# Patient Record
Sex: FEMALE | Race: WHITE | NOT HISPANIC OR LATINO | Employment: OTHER | URBAN - METROPOLITAN AREA
[De-identification: names, ages, dates, MRNs, and addresses within clinical notes are randomized per-mention and may not be internally consistent; named-entity substitution may affect disease eponyms.]

---

## 2021-03-24 ENCOUNTER — APPOINTMENT (OUTPATIENT)
Dept: WOMENS IMAGING | Facility: HOSPITAL | Age: 66
End: 2021-03-24

## 2021-03-24 PROCEDURE — 77067 SCR MAMMO BI INCL CAD: CPT | Performed by: RADIOLOGY

## 2022-03-22 RX ORDER — AMLODIPINE BESYLATE 5 MG/1
TABLET ORAL
Qty: 30 TABLET | Refills: 0 | Status: SHIPPED | OUTPATIENT
Start: 2022-03-22 | End: 2022-05-09 | Stop reason: SDUPTHER

## 2022-03-22 RX ORDER — AMLODIPINE BESYLATE 5 MG/1
5 TABLET ORAL DAILY
COMMUNITY
End: 2022-04-08 | Stop reason: SDUPTHER

## 2022-03-28 RX ORDER — DULOXETIN HYDROCHLORIDE 30 MG/1
90 CAPSULE, DELAYED RELEASE ORAL DAILY
Qty: 270 CAPSULE | Refills: 1 | Status: SHIPPED | OUTPATIENT
Start: 2022-03-28 | End: 2022-05-09 | Stop reason: SDUPTHER

## 2022-04-05 RX ORDER — CARVEDILOL 25 MG/1
TABLET ORAL
COMMUNITY
Start: 2022-02-23 | End: 2022-04-05 | Stop reason: SDUPTHER

## 2022-04-05 RX ORDER — CARVEDILOL 25 MG/1
25 TABLET ORAL 2 TIMES DAILY WITH MEALS
Qty: 180 TABLET | Refills: 1 | Status: SHIPPED | OUTPATIENT
Start: 2022-04-05 | End: 2022-05-09 | Stop reason: SDUPTHER

## 2022-04-05 RX ORDER — LISINOPRIL 10 MG/1
TABLET ORAL
Qty: 90 TABLET | Refills: 1 | Status: SHIPPED | OUTPATIENT
Start: 2022-04-05 | End: 2022-05-09

## 2022-04-06 RX ORDER — ESOMEPRAZOLE MAGNESIUM 40 MG/1
40 CAPSULE, DELAYED RELEASE ORAL
Qty: 90 CAPSULE | Refills: 1 | Status: SHIPPED | OUTPATIENT
Start: 2022-04-06 | End: 2022-05-09 | Stop reason: SDUPTHER

## 2022-04-06 RX ORDER — OXYBUTYNIN CHLORIDE 10 MG/1
TABLET, EXTENDED RELEASE ORAL
COMMUNITY
Start: 2022-03-18 | End: 2022-04-06 | Stop reason: SDUPTHER

## 2022-04-06 RX ORDER — ESOMEPRAZOLE MAGNESIUM 40 MG/1
CAPSULE, DELAYED RELEASE ORAL
COMMUNITY
Start: 2022-03-16 | End: 2022-04-06 | Stop reason: SDUPTHER

## 2022-04-06 RX ORDER — OXYBUTYNIN CHLORIDE 10 MG/1
10 TABLET, EXTENDED RELEASE ORAL DAILY
Qty: 90 TABLET | Refills: 1 | Status: SHIPPED | OUTPATIENT
Start: 2022-04-06 | End: 2022-05-09 | Stop reason: SDUPTHER

## 2022-04-06 NOTE — TELEPHONE ENCOUNTER
PATIENT CALLED SHE SAID SHE IS COMPLETLEY OUT OF BLADDER CONTROL  MEDICATION AND HAS ABOUT A WEEK LEFT OF HER ACID REFLUX MEDICATION. SHE USED [facility]

## 2022-04-08 RX ORDER — AMLODIPINE BESYLATE 5 MG/1
TABLET ORAL
Qty: 30 TABLET | Refills: 0 | Status: SHIPPED | OUTPATIENT
Start: 2022-04-08 | End: 2022-05-09 | Stop reason: SDUPTHER

## 2022-05-09 ENCOUNTER — OFFICE VISIT (OUTPATIENT)
Dept: FAMILY MEDICINE CLINIC | Facility: CLINIC | Age: 67
End: 2022-05-09

## 2022-05-09 VITALS
BODY MASS INDEX: 41.54 KG/M2 | WEIGHT: 220 LBS | HEIGHT: 61 IN | DIASTOLIC BLOOD PRESSURE: 86 MMHG | SYSTOLIC BLOOD PRESSURE: 160 MMHG | OXYGEN SATURATION: 90 % | TEMPERATURE: 97.7 F | HEART RATE: 93 BPM

## 2022-05-09 DIAGNOSIS — H61.23 BILATERAL IMPACTED CERUMEN: ICD-10-CM

## 2022-05-09 DIAGNOSIS — K21.9 GASTROESOPHAGEAL REFLUX DISEASE, UNSPECIFIED WHETHER ESOPHAGITIS PRESENT: ICD-10-CM

## 2022-05-09 DIAGNOSIS — E03.9 ACQUIRED HYPOTHYROIDISM: ICD-10-CM

## 2022-05-09 DIAGNOSIS — N18.31 STAGE 3A CHRONIC KIDNEY DISEASE: ICD-10-CM

## 2022-05-09 DIAGNOSIS — G89.4 CHRONIC PAIN SYNDROME: ICD-10-CM

## 2022-05-09 DIAGNOSIS — Z51.81 MEDICATION MONITORING ENCOUNTER: ICD-10-CM

## 2022-05-09 DIAGNOSIS — Z79.890 HORMONE REPLACEMENT THERAPY (HRT): ICD-10-CM

## 2022-05-09 DIAGNOSIS — F41.1 GAD (GENERALIZED ANXIETY DISORDER): ICD-10-CM

## 2022-05-09 DIAGNOSIS — E66.01 MORBID OBESITY: ICD-10-CM

## 2022-05-09 DIAGNOSIS — I10 PRIMARY HYPERTENSION: Primary | ICD-10-CM

## 2022-05-09 PROBLEM — N20.2 CALCULUS OF KIDNEY AND URETER: Status: ACTIVE | Noted: 2020-08-11

## 2022-05-09 PROBLEM — R39.15 URINARY URGENCY: Status: ACTIVE | Noted: 2020-01-14

## 2022-05-09 PROBLEM — R35.0 INCREASED FREQUENCY OF URINATION: Status: ACTIVE | Noted: 2020-01-13

## 2022-05-09 PROBLEM — N32.81 OAB (OVERACTIVE BLADDER): Status: ACTIVE | Noted: 2019-07-31

## 2022-05-09 PROBLEM — R31.29 MICROSCOPIC HEMATURIA: Status: ACTIVE | Noted: 2019-10-01

## 2022-05-09 PROBLEM — N32.81 OVERACTIVE BLADDER: Status: ACTIVE | Noted: 2020-08-11

## 2022-05-09 PROBLEM — E66.9 OBESITY WITH BODY MASS INDEX 30 OR GREATER: Status: ACTIVE | Noted: 2020-08-11

## 2022-05-09 PROBLEM — N39.0 URINARY TRACT INFECTIOUS DISEASE: Status: ACTIVE | Noted: 2019-07-31

## 2022-05-09 PROBLEM — N39.0 RECURRENT URINARY TRACT INFECTION: Status: ACTIVE | Noted: 2019-08-29

## 2022-05-09 LAB
EXPIRATION DATE: ABNORMAL
Lab: ABNORMAL
POC AMPHETAMINES: NEGATIVE
POC BARBITURATES: NEGATIVE
POC BENZODIAZEPHINES: POSITIVE
POC COCAINE: NEGATIVE
POC METHADONE: NEGATIVE
POC METHAMPHETAMINE SCREEN URINE: NEGATIVE
POC OPIATES: NEGATIVE
POC OXYCODONE: NEGATIVE
POC PHENCYCLIDINE: NEGATIVE
POC PROPOXYPHENE: NEGATIVE
POC THC: NEGATIVE
POC TRICYCLIC ANTIDEPRESSANTS: POSITIVE

## 2022-05-09 PROCEDURE — 99213 OFFICE O/P EST LOW 20 MIN: CPT | Performed by: FAMILY MEDICINE

## 2022-05-09 PROCEDURE — 69210 REMOVE IMPACTED EAR WAX UNI: CPT | Performed by: FAMILY MEDICINE

## 2022-05-09 PROCEDURE — 80305 DRUG TEST PRSMV DIR OPT OBS: CPT | Performed by: FAMILY MEDICINE

## 2022-05-09 RX ORDER — HYDROCODONE BITARTRATE AND ACETAMINOPHEN 7.5; 325 MG/1; MG/1
1 TABLET ORAL EVERY 6 HOURS
COMMUNITY
End: 2022-11-15

## 2022-05-09 RX ORDER — LOSARTAN POTASSIUM 50 MG/1
50 TABLET ORAL DAILY
Qty: 90 TABLET | Refills: 1 | Status: SHIPPED | OUTPATIENT
Start: 2022-05-09 | End: 2022-11-15 | Stop reason: SDUPTHER

## 2022-05-09 RX ORDER — IBUPROFEN 600 MG/1
600 TABLET ORAL EVERY 8 HOURS PRN
Qty: 90 TABLET | Refills: 1 | Status: SHIPPED | OUTPATIENT
Start: 2022-05-09 | End: 2023-01-13 | Stop reason: SDUPTHER

## 2022-05-09 RX ORDER — LOSARTAN POTASSIUM 50 MG/1
TABLET ORAL
COMMUNITY
Start: 2022-04-05 | End: 2022-05-09 | Stop reason: SDUPTHER

## 2022-05-09 RX ORDER — DIAZEPAM 10 MG/1
10 TABLET ORAL EVERY 8 HOURS PRN
Qty: 90 TABLET | Refills: 2 | Status: SHIPPED | OUTPATIENT
Start: 2022-05-09 | End: 2022-06-09

## 2022-05-09 RX ORDER — BUSPIRONE HYDROCHLORIDE 30 MG/1
TABLET ORAL EVERY 12 HOURS SCHEDULED
COMMUNITY
End: 2022-05-09 | Stop reason: SDUPTHER

## 2022-05-09 RX ORDER — HYDROXYZINE 50 MG/1
TABLET, FILM COATED ORAL EVERY 6 HOURS SCHEDULED
COMMUNITY
End: 2022-05-09 | Stop reason: SDUPTHER

## 2022-05-09 RX ORDER — ESTRADIOL 2 MG/1
TABLET ORAL
COMMUNITY
End: 2022-05-09 | Stop reason: SDUPTHER

## 2022-05-09 RX ORDER — DIAZEPAM 10 MG/1
TABLET ORAL EVERY 12 HOURS SCHEDULED
COMMUNITY
End: 2022-05-09 | Stop reason: SDUPTHER

## 2022-05-09 RX ORDER — OXYBUTYNIN CHLORIDE 10 MG/1
TABLET, EXTENDED RELEASE ORAL
COMMUNITY
Start: 2021-12-20 | End: 2022-06-09 | Stop reason: SDUPTHER

## 2022-05-09 RX ORDER — PREGABALIN 50 MG/1
50 CAPSULE ORAL DAILY
Qty: 90 CAPSULE | Refills: 1 | Status: CANCELLED | OUTPATIENT
Start: 2022-05-09

## 2022-05-09 RX ORDER — TIZANIDINE 4 MG/1
1 TABLET ORAL EVERY 6 HOURS
COMMUNITY
End: 2022-05-09 | Stop reason: SDUPTHER

## 2022-05-09 RX ORDER — BUSPIRONE HYDROCHLORIDE 30 MG/1
30 TABLET ORAL EVERY 12 HOURS SCHEDULED
Qty: 180 TABLET | Refills: 1 | Status: SHIPPED | OUTPATIENT
Start: 2022-05-09 | End: 2022-11-15 | Stop reason: SDUPTHER

## 2022-05-09 RX ORDER — DULOXETIN HYDROCHLORIDE 30 MG/1
120 CAPSULE, DELAYED RELEASE ORAL DAILY
Qty: 120 CAPSULE | Refills: 1 | Status: SHIPPED | OUTPATIENT
Start: 2022-05-09 | End: 2022-07-21

## 2022-05-09 RX ORDER — CARVEDILOL 25 MG/1
25 TABLET ORAL 2 TIMES DAILY WITH MEALS
Qty: 180 TABLET | Refills: 1 | Status: SHIPPED | OUTPATIENT
Start: 2022-05-09 | End: 2022-11-15 | Stop reason: SDUPTHER

## 2022-05-09 RX ORDER — ESOMEPRAZOLE MAGNESIUM 40 MG/1
40 CAPSULE, DELAYED RELEASE ORAL
Qty: 90 CAPSULE | Refills: 1 | Status: SHIPPED | OUTPATIENT
Start: 2022-05-09 | End: 2023-03-23

## 2022-05-09 RX ORDER — CARVEDILOL 25 MG/1
1 TABLET ORAL 2 TIMES DAILY WITH MEALS
COMMUNITY
Start: 2021-11-29 | End: 2022-05-09 | Stop reason: SDUPTHER

## 2022-05-09 RX ORDER — QUETIAPINE FUMARATE 400 MG/1
TABLET, FILM COATED ORAL EVERY 12 HOURS SCHEDULED
COMMUNITY
End: 2022-05-09 | Stop reason: SDUPTHER

## 2022-05-09 RX ORDER — AMLODIPINE BESYLATE 5 MG/1
5 TABLET ORAL DAILY
Qty: 90 TABLET | Refills: 1 | Status: SHIPPED | OUTPATIENT
Start: 2022-05-09 | End: 2022-11-15 | Stop reason: SDUPTHER

## 2022-05-09 RX ORDER — IBUPROFEN 600 MG/1
TABLET ORAL
COMMUNITY
Start: 2022-05-01 | End: 2022-05-09 | Stop reason: SDUPTHER

## 2022-05-09 RX ORDER — LEVOTHYROXINE SODIUM 0.05 MG/1
TABLET ORAL
COMMUNITY
Start: 2021-11-29 | End: 2022-05-09 | Stop reason: SDUPTHER

## 2022-05-09 RX ORDER — ESTRADIOL 2 MG/1
2 TABLET ORAL DAILY
Qty: 90 TABLET | Refills: 1 | Status: SHIPPED | OUTPATIENT
Start: 2022-05-09 | End: 2022-11-14

## 2022-05-09 RX ORDER — QUETIAPINE FUMARATE 400 MG/1
400 TABLET, FILM COATED ORAL EVERY 12 HOURS SCHEDULED
Qty: 180 TABLET | Refills: 1 | Status: SHIPPED | OUTPATIENT
Start: 2022-05-09 | End: 2022-08-22 | Stop reason: SDUPTHER

## 2022-05-09 RX ORDER — AMLODIPINE BESYLATE 5 MG/1
1 TABLET ORAL DAILY
COMMUNITY
Start: 2021-11-29 | End: 2022-05-09

## 2022-05-09 RX ORDER — TIZANIDINE 4 MG/1
4 TABLET ORAL EVERY 6 HOURS
Qty: 120 TABLET | Refills: 1 | Status: SHIPPED | OUTPATIENT
Start: 2022-05-09 | End: 2022-08-17 | Stop reason: SDUPTHER

## 2022-05-09 RX ORDER — HYDROXYZINE 50 MG/1
50 TABLET, FILM COATED ORAL EVERY 6 HOURS SCHEDULED
Qty: 120 TABLET | Refills: 1 | Status: SHIPPED | OUTPATIENT
Start: 2022-05-09 | End: 2022-12-01 | Stop reason: SDUPTHER

## 2022-05-09 RX ORDER — LEVOTHYROXINE SODIUM 0.05 MG/1
50 TABLET ORAL DAILY
Qty: 90 TABLET | Refills: 1 | Status: SHIPPED | OUTPATIENT
Start: 2022-05-09 | End: 2022-08-17 | Stop reason: SDUPTHER

## 2022-05-09 RX ORDER — PREGABALIN 50 MG/1
CAPSULE ORAL
COMMUNITY
Start: 2022-04-05 | End: 2022-05-17 | Stop reason: SDUPTHER

## 2022-05-09 RX ORDER — DULOXETIN HYDROCHLORIDE 30 MG/1
CAPSULE, DELAYED RELEASE ORAL
COMMUNITY
End: 2022-05-09 | Stop reason: SDUPTHER

## 2022-05-09 NOTE — ASSESSMENT & PLAN NOTE
Renal condition is newly identified.  Continue current treatment regimen.  Weight loss.  Renal condition will be reassessed in 1 month.

## 2022-05-09 NOTE — ASSESSMENT & PLAN NOTE
Diazepam refilled.  Adverse effects discussed.  Avoid alcohol, driving, or operating machinery while taking medications.

## 2022-05-09 NOTE — PROGRESS NOTES
Date: 2022   Patient Name: Alina Tilley  : 1955   MRN: [id number]     Chief Complaint:    Chief Complaint   Patient presents with   • Med Refill       History of Present Illness: Alina Tilley is a 66 y.o. female who is here today to follow up for Chronic medical conditions to include hypertension, anxiety, GERD, hormone replacement therapy, and chronic pain syndrome.  She is needing multiple medication refills and reports medications continue to work well without side effects.  She is complaining of bilateral ear pain and ears feeling full.             Review of Systems:   Review of Systems   Constitutional: Negative for chills, fatigue and fever.   Respiratory: Negative for cough and shortness of breath.    Cardiovascular: Negative for chest pain and palpitations.   Gastrointestinal: Negative for abdominal pain, constipation, diarrhea, nausea and vomiting.   Musculoskeletal: Positive for arthralgias and myalgias. Negative for back pain.   Neurological: Negative for dizziness and headache.   Psychiatric/Behavioral: Negative for depressed mood. The patient is not nervous/anxious.        Past Medical History:   Past Medical History:   Diagnosis Date   • Anxiety    • Arthritis    • Common migraine    • Congestive heart failure (CHF) (HCC)    • Esophageal reflux    • Hearing problem    • IBS (irritable bowel syndrome)    • Insomnia    • Neurotic depression    • Obesity    • Overactive bladder    • Thyroiditis    • Vision problems        Past Surgical History:   Past Surgical History:   Procedure Laterality Date   • APPENDECTOMY     • HERNIA REPAIR     • HYSTERECTOMY     • KIDNEY SURGERY     • LAPAROSCOPIC TUBAL LIGATION     • TONSILLECTOMY         Family History:   Family History   Problem Relation Age of Onset   • Osteoporosis Mother    • Osteoarthritis Mother    • Hypertension Father    • Mental illness Father    • Alcohol abuse Father    • Depression Father    • Hypertension Brother    • Mental  illness Brother    • Alcohol abuse Brother    • Obesity Other    • Irritable bowel syndrome Other    • Mental illness Other    • Stroke Other    • Diabetes Other    • Cancer Other        Social History:   Social History     Socioeconomic History   • Marital status: Unknown   Tobacco Use   • Smoking status: Never Smoker   • Smokeless tobacco: Never Used   Vaping Use   • Vaping Use: Never used   Substance and Sexual Activity   • Alcohol use: Never   • Drug use: Never   • Sexual activity: Defer       Medications:     Current Outpatient Medications:   •  amLODIPine (NORVASC) 5 MG tablet, Take 1 tablet by mouth Daily., Disp: 90 tablet, Rfl: 1  •  busPIRone (BUSPAR) 30 MG tablet, Take 1 tablet by mouth Every 12 (Twelve) Hours., Disp: 180 tablet, Rfl: 1  •  carvedilol (COREG) 25 MG tablet, Take 1 tablet by mouth 2 (Two) Times a Day With Meals., Disp: 180 tablet, Rfl: 1  •  diazePAM (VALIUM) 10 MG tablet, Take 1 tablet by mouth Every 8 (Eight) Hours As Needed for Anxiety., Disp: 90 tablet, Rfl: 2  •  DULoxetine (CYMBALTA) 30 MG capsule, Take 4 capsules by mouth Daily., Disp: 120 capsule, Rfl: 1  •  esomeprazole (nexIUM) 40 MG capsule, Take 1 capsule by mouth Every Morning Before Breakfast., Disp: 90 capsule, Rfl: 1  •  estradiol (ESTRACE) 2 MG tablet, Take 1 tablet by mouth Daily., Disp: 90 tablet, Rfl: 1  •  HYDROcodone-acetaminophen (NORCO) 7.5-325 MG per tablet, Take 1 tablet by mouth Every 6 (Six) Hours., Disp: , Rfl:   •  hydrOXYzine (ATARAX) 50 MG tablet, Take 1 tablet by mouth Every 6 (Six) Hours., Disp: 120 tablet, Rfl: 1  •  ibuprofen (ADVIL,MOTRIN) 600 MG tablet, Take 1 tablet by mouth Every 8 (Eight) Hours As Needed for Mild Pain  or Moderate Pain ., Disp: 90 tablet, Rfl: 1  •  levothyroxine (SYNTHROID, LEVOTHROID) 50 MCG tablet, Take 1 tablet by mouth Daily., Disp: 90 tablet, Rfl: 1  •  losartan (COZAAR) 50 MG tablet, Take 1 tablet by mouth Daily., Disp: 90 tablet, Rfl: 1  •  oxybutynin XL (DITROPAN-XL) 10 MG 24  "hr tablet, oxybutynin chloride ER 10 mg tablet,extended release 24 hr  Take 1 tablet every day by oral route., Disp: , Rfl:   •  pregabalin (LYRICA) 50 MG capsule, , Disp: , Rfl:   •  QUEtiapine (SEROquel) 400 MG tablet, Take 1 tablet by mouth Every 12 (Twelve) Hours., Disp: 180 tablet, Rfl: 1  •  tiZANidine (ZANAFLEX) 4 MG tablet, Take 1 tablet by mouth Every 6 (Six) Hours., Disp: 120 tablet, Rfl: 1    Allergies:   Allergies   Allergen Reactions   • Aripiprazole Unknown - Low Severity   • Mirabegron Unknown - Low Severity   • Tramadol Unknown - Low Severity       PHQ-2 Total Score: 0   PHQ-9 Total Score: 0     Physical Exam:  Vital Signs:   Vitals:    05/09/22 1103   BP: 160/86   BP Location: Left arm   Patient Position: Sitting   Cuff Size: Large Adult   Pulse: 93   Temp: 97.7 °F (36.5 °C)   TempSrc: Temporal   SpO2: 90%   Weight: 99.8 kg (220 lb)   Height: 154.9 cm (61\")     Body mass index is 41.57 kg/m².       Physical Exam  Vitals and nursing note reviewed.   Constitutional:       Appearance: She is obese.      Comments: Chronically ill-appearing   HENT:      Head: Normocephalic and atraumatic.      Right Ear: There is impacted cerumen.      Left Ear: There is impacted cerumen.      Nose: Nose normal.      Mouth/Throat:      Mouth: Mucous membranes are moist.      Pharynx: Oropharynx is clear.   Eyes:      Conjunctiva/sclera: Conjunctivae normal.      Pupils: Pupils are equal, round, and reactive to light.   Cardiovascular:      Rate and Rhythm: Normal rate and regular rhythm.      Heart sounds: Normal heart sounds. No murmur heard.  Pulmonary:      Effort: Pulmonary effort is normal.      Breath sounds: Normal breath sounds. No wheezing, rhonchi or rales.   Abdominal:      General: Bowel sounds are normal.      Palpations: Abdomen is soft.      Tenderness: There is no abdominal tenderness.   Musculoskeletal:      Cervical back: Normal range of motion and neck supple.      Right lower leg: No edema.      Left " lower leg: No edema.   Lymphadenopathy:      Cervical: No cervical adenopathy.   Skin:     General: Skin is warm.      Findings: No rash.   Neurological:      General: No focal deficit present.      Mental Status: She is alert and oriented to person, place, and time.      Motor: No weakness.   Psychiatric:         Mood and Affect: Mood normal.         Behavior: Behavior normal.       Ear Cerumen Removal    Date/Time: 5/9/2022 11:54 AM  Performed by: Avis Mejias DO  Authorized by: Avis Mejias DO     Anesthesia:  Local Anesthetic: none  Location details: left ear and right ear  Patient tolerance: patient tolerated the procedure well with no immediate complications  Procedure type: instrumentation, irrigation   Sedation:  Patient sedated: no            Assessment/Plan:   Diagnoses and all orders for this visit:    1. Primary hypertension (Primary)  Assessment & Plan:  Hypertension is worsening.  Weight loss.  Regular aerobic exercise.  Ambulatory blood pressure monitoring.  Blood pressure will be reassessed in 4 weeks.    Orders:  -     amLODIPine (NORVASC) 5 MG tablet; Take 1 tablet by mouth Daily.  Dispense: 90 tablet; Refill: 1  -     carvedilol (COREG) 25 MG tablet; Take 1 tablet by mouth 2 (Two) Times a Day With Meals.  Dispense: 180 tablet; Refill: 1  -     losartan (COZAAR) 50 MG tablet; Take 1 tablet by mouth Daily.  Dispense: 90 tablet; Refill: 1    2. Gastroesophageal reflux disease, unspecified whether esophagitis present  Assessment & Plan:  Stable on PPI    Orders:  -     esomeprazole (nexIUM) 40 MG capsule; Take 1 capsule by mouth Every Morning Before Breakfast.  Dispense: 90 capsule; Refill: 1    3. BARBARA (generalized anxiety disorder)  Assessment & Plan:  Diazepam refilled.  Adverse effects discussed.  Avoid alcohol, driving, or operating machinery while taking medications.      Orders:  -     busPIRone (BUSPAR) 30 MG tablet; Take 1 tablet by mouth Every 12 (Twelve)  Hours.  Dispense: 180 tablet; Refill: 1  -     diazePAM (VALIUM) 10 MG tablet; Take 1 tablet by mouth Every 8 (Eight) Hours As Needed for Anxiety.  Dispense: 90 tablet; Refill: 2  -     DULoxetine (CYMBALTA) 30 MG capsule; Take 4 capsules by mouth Daily.  Dispense: 120 capsule; Refill: 1  -     hydrOXYzine (ATARAX) 50 MG tablet; Take 1 tablet by mouth Every 6 (Six) Hours.  Dispense: 120 tablet; Refill: 1  -     QUEtiapine (SEROquel) 400 MG tablet; Take 1 tablet by mouth Every 12 (Twelve) Hours.  Dispense: 180 tablet; Refill: 1    4. Hormone replacement therapy (HRT)  Assessment & Plan:  Stable on estradiol    Orders:  -     estradiol (ESTRACE) 2 MG tablet; Take 1 tablet by mouth Daily.  Dispense: 90 tablet; Refill: 1    5. Acquired hypothyroidism  -     levothyroxine (SYNTHROID, LEVOTHROID) 50 MCG tablet; Take 1 tablet by mouth Daily.  Dispense: 90 tablet; Refill: 1  -     TSH; Future  -     TSH    6. Chronic pain syndrome  -     ibuprofen (ADVIL,MOTRIN) 600 MG tablet; Take 1 tablet by mouth Every 8 (Eight) Hours As Needed for Mild Pain  or Moderate Pain .  Dispense: 90 tablet; Refill: 1  -     tiZANidine (ZANAFLEX) 4 MG tablet; Take 1 tablet by mouth Every 6 (Six) Hours.  Dispense: 120 tablet; Refill: 1    7. Stage 3a chronic kidney disease (HCC)  Assessment & Plan:  Renal condition is newly identified.  Continue current treatment regimen.  Weight loss.  Renal condition will be reassessed in 1 month.    Orders:  -     Basic metabolic panel; Future  -     Basic metabolic panel    8. Morbid obesity (HCC)  Assessment & Plan:  Patient's (Body mass index is 41.57 kg/m².) indicates that they are morbidly obese (BMI > 40 or > 35 with obesity - related health condition) with health conditions that include hypertension, dyslipidemias and GERD . Weight is unchanged. BMI is is above average; BMI management plan is completed. We discussed portion control and increasing exercise.       9. Bilateral impacted cerumen  Assessment  & Plan:  Resolved after irrigation and instrumentation    Orders:  -     Cerumen Removal    10. Medication monitoring encounter  -     POC Urine Drug Screen, Triage         Follow Up:   Return in about 1 month (around 6/9/2022) for BP check.    Avis Mejias DO  [facility] [place] [facility]

## 2022-05-09 NOTE — ASSESSMENT & PLAN NOTE
Patient's (Body mass index is 41.57 kg/m².) indicates that they are morbidly obese (BMI > 40 or > 35 with obesity - related health condition) with health conditions that include hypertension, dyslipidemias and GERD . Weight is unchanged. BMI is is above average; BMI management plan is completed. We discussed portion control and increasing exercise.

## 2022-05-09 NOTE — ASSESSMENT & PLAN NOTE
Hypertension is worsening.  Weight loss.  Regular aerobic exercise.  Ambulatory blood pressure monitoring.  Blood pressure will be reassessed in 4 weeks.

## 2022-05-10 LAB
BUN SERPL-MCNC: 12 MG/DL (ref 8–27)
BUN/CREAT SERPL: 12 (ref 12–28)
CALCIUM SERPL-MCNC: 8.5 MG/DL (ref 8.7–10.3)
CHLORIDE SERPL-SCNC: 105 MMOL/L (ref 96–106)
CO2 SERPL-SCNC: 23 MMOL/L (ref 20–29)
CREAT SERPL-MCNC: 1 MG/DL (ref 0.57–1)
EGFRCR SERPLBLD CKD-EPI 2021: 61 ML/MIN/1.73
GLUCOSE SERPL-MCNC: 137 MG/DL (ref 65–99)
POTASSIUM SERPL-SCNC: 3.4 MMOL/L (ref 3.5–5.2)
SODIUM SERPL-SCNC: 142 MMOL/L (ref 134–144)
TSH SERPL DL<=0.005 MIU/L-ACNC: 2.76 UIU/ML (ref 0.45–4.5)

## 2022-05-17 RX ORDER — PREGABALIN 50 MG/1
50 CAPSULE ORAL 2 TIMES DAILY
Qty: 60 CAPSULE | Refills: 2 | Status: SHIPPED | OUTPATIENT
Start: 2022-05-17 | End: 2022-08-17 | Stop reason: SDUPTHER

## 2022-06-01 RX ORDER — DIAZEPAM 5 MG/1
TABLET ORAL
Qty: 90 TABLET | OUTPATIENT
Start: 2022-06-01

## 2022-06-07 RX ORDER — DIAZEPAM 5 MG/1
TABLET ORAL
Qty: 90 TABLET | Refills: 2 | Status: SHIPPED | OUTPATIENT
Start: 2022-06-07 | End: 2022-11-15 | Stop reason: SDUPTHER

## 2022-06-09 ENCOUNTER — TELEMEDICINE (OUTPATIENT)
Dept: FAMILY MEDICINE CLINIC | Facility: CLINIC | Age: 67
End: 2022-06-09

## 2022-06-09 VITALS — BODY MASS INDEX: 41.54 KG/M2 | WEIGHT: 220 LBS | HEIGHT: 61 IN

## 2022-06-09 DIAGNOSIS — N32.81 OAB (OVERACTIVE BLADDER): Primary | ICD-10-CM

## 2022-06-09 PROCEDURE — 99212 OFFICE O/P EST SF 10 MIN: CPT | Performed by: FAMILY MEDICINE

## 2022-06-09 RX ORDER — BUSPIRONE HYDROCHLORIDE 30 MG/1
30 TABLET ORAL EVERY 12 HOURS SCHEDULED
Qty: 180 TABLET | Refills: 1 | Status: CANCELLED | OUTPATIENT
Start: 2022-06-09

## 2022-06-09 RX ORDER — IBUPROFEN 600 MG/1
600 TABLET ORAL EVERY 8 HOURS PRN
Qty: 90 TABLET | Refills: 1 | Status: CANCELLED | OUTPATIENT
Start: 2022-06-09

## 2022-06-09 RX ORDER — DIAZEPAM 5 MG/1
5 TABLET ORAL 3 TIMES DAILY PRN
Qty: 90 TABLET | Refills: 2 | Status: CANCELLED | OUTPATIENT
Start: 2022-06-09

## 2022-06-09 RX ORDER — LOSARTAN POTASSIUM 50 MG/1
50 TABLET ORAL DAILY
Qty: 90 TABLET | Refills: 1 | Status: CANCELLED | OUTPATIENT
Start: 2022-06-09

## 2022-06-09 RX ORDER — PREGABALIN 50 MG/1
50 CAPSULE ORAL 2 TIMES DAILY
Qty: 180 CAPSULE | Refills: 1 | Status: CANCELLED | OUTPATIENT
Start: 2022-06-09

## 2022-06-09 RX ORDER — OXYBUTYNIN CHLORIDE 10 MG/1
10 TABLET, EXTENDED RELEASE ORAL DAILY
Qty: 90 TABLET | Refills: 1 | Status: SHIPPED | OUTPATIENT
Start: 2022-06-09 | End: 2022-11-15 | Stop reason: SDUPTHER

## 2022-06-09 NOTE — PROGRESS NOTES
Patient was seen today through synchronous audio/video technology. Verbal consent was obtained. The patient was located at home. Vitals signs were not obtained due to lack of home monitoring access. Time spent with patient was 10 minutes.       Date: 2022   Patient Name: Alina Tilley  : 1955   MRN: [id number]     Chief Complaint:    Chief Complaint   Patient presents with   • Med Refill     Controlled substance refill        History of Present Illness: Alina Tilley is a 67 y.o. female who is here today to follow up for Overactive bladder. She is doing well on Oxybutynin and requests refill of this medication. She denies side effects.             Review of Systems:   Review of Systems   Constitutional: Positive for fatigue. Negative for chills and fever.   Respiratory: Negative for cough and shortness of breath.    Cardiovascular: Negative for chest pain and palpitations.   Gastrointestinal: Negative for abdominal pain, constipation, diarrhea, nausea and vomiting.   Genitourinary: Positive for frequency, urgency and urinary incontinence.   Musculoskeletal: Positive for arthralgias, back pain and myalgias.   Neurological: Negative for dizziness and headache.   Psychiatric/Behavioral: Negative for depressed mood. The patient is not nervous/anxious.        Past Medical History:   Past Medical History:   Diagnosis Date   • Anxiety    • Arthritis    • Common migraine    • Congestive heart failure (CHF) (HCC)    • Esophageal reflux    • Hearing problem    • IBS (irritable bowel syndrome)    • Insomnia    • Neurotic depression    • Obesity    • Overactive bladder    • Thyroiditis    • Vision problems        Past Surgical History:   Past Surgical History:   Procedure Laterality Date   • APPENDECTOMY     • HERNIA REPAIR     • HYSTERECTOMY     • KIDNEY SURGERY     • LAPAROSCOPIC TUBAL LIGATION     • TONSILLECTOMY         Family History:   Family History   Problem Relation Age of Onset   • Osteoporosis Mother     • Osteoarthritis Mother    • Hypertension Father    • Mental illness Father    • Alcohol abuse Father    • Depression Father    • Hypertension Brother    • Mental illness Brother    • Alcohol abuse Brother    • Obesity Other    • Irritable bowel syndrome Other    • Mental illness Other    • Stroke Other    • Diabetes Other    • Cancer Other        Social History:   Social History     Socioeconomic History   • Marital status: Unknown   Tobacco Use   • Smoking status: Never Smoker   • Smokeless tobacco: Never Used   Vaping Use   • Vaping Use: Never used   Substance and Sexual Activity   • Alcohol use: Never   • Drug use: Never   • Sexual activity: Defer       Medications:     Current Outpatient Medications:   •  amLODIPine (NORVASC) 5 MG tablet, Take 1 tablet by mouth Daily., Disp: 90 tablet, Rfl: 1  •  busPIRone (BUSPAR) 30 MG tablet, Take 1 tablet by mouth Every 12 (Twelve) Hours., Disp: 180 tablet, Rfl: 1  •  carvedilol (COREG) 25 MG tablet, Take 1 tablet by mouth 2 (Two) Times a Day With Meals., Disp: 180 tablet, Rfl: 1  •  diazePAM (VALIUM) 5 MG tablet, TAKE ONE TABLET BY MOUTH THREE TIMES A DAY AS NEEDED FOR ANXIETY, Disp: 90 tablet, Rfl: 2  •  DULoxetine (CYMBALTA) 30 MG capsule, Take 4 capsules by mouth Daily., Disp: 120 capsule, Rfl: 1  •  esomeprazole (nexIUM) 40 MG capsule, Take 1 capsule by mouth Every Morning Before Breakfast., Disp: 90 capsule, Rfl: 1  •  estradiol (ESTRACE) 2 MG tablet, Take 1 tablet by mouth Daily., Disp: 90 tablet, Rfl: 1  •  HYDROcodone-acetaminophen (NORCO) 7.5-325 MG per tablet, Take 1 tablet by mouth Every 6 (Six) Hours., Disp: , Rfl:   •  hydrOXYzine (ATARAX) 50 MG tablet, Take 1 tablet by mouth Every 6 (Six) Hours., Disp: 120 tablet, Rfl: 1  •  ibuprofen (ADVIL,MOTRIN) 600 MG tablet, Take 1 tablet by mouth Every 8 (Eight) Hours As Needed for Mild Pain  or Moderate Pain ., Disp: 90 tablet, Rfl: 1  •  levothyroxine (SYNTHROID, LEVOTHROID) 50 MCG tablet, Take 1 tablet by mouth  "Daily., Disp: 90 tablet, Rfl: 1  •  losartan (COZAAR) 50 MG tablet, Take 1 tablet by mouth Daily., Disp: 90 tablet, Rfl: 1  •  oxybutynin XL (DITROPAN-XL) 10 MG 24 hr tablet, Take 1 tablet by mouth Daily., Disp: 90 tablet, Rfl: 1  •  pregabalin (LYRICA) 50 MG capsule, Take 1 capsule by mouth 2 (Two) Times a Day., Disp: 60 capsule, Rfl: 2  •  QUEtiapine (SEROquel) 400 MG tablet, Take 1 tablet by mouth Every 12 (Twelve) Hours., Disp: 180 tablet, Rfl: 1  •  tiZANidine (ZANAFLEX) 4 MG tablet, Take 1 tablet by mouth Every 6 (Six) Hours., Disp: 120 tablet, Rfl: 1    Allergies:   Allergies   Allergen Reactions   • Aripiprazole Unknown - Low Severity   • Mirabegron Unknown - Low Severity   • Tramadol Unknown - Low Severity       PHQ-2 Total Score:     PHQ-9 Total Score:       Physical Exam:  Vital Signs:   Vitals:    06/09/22 1252   Weight: 99.8 kg (220 lb)   Height: 154.9 cm (61\")     Body mass index is 41.57 kg/m².     Physical Exam  Vitals and nursing note reviewed.   Neurological:      Mental Status: She is alert and oriented to person, place, and time.   Psychiatric:         Mood and Affect: Mood normal.           Assessment/Plan:   Diagnoses and all orders for this visit:    1. OAB (overactive bladder) (Primary)  Assessment & Plan:  Stable, oxybutynin refilled.     Orders:  -     oxybutynin XL (DITROPAN-XL) 10 MG 24 hr tablet; Take 1 tablet by mouth Daily.  Dispense: 90 tablet; Refill: 1         Follow Up:   Return in about 2 months (around 8/9/2022) for Med Recheck .    Avis Mejias, DO  [facility]    "

## 2022-07-14 ENCOUNTER — OFFICE VISIT (OUTPATIENT)
Dept: FAMILY MEDICINE CLINIC | Facility: CLINIC | Age: 67
End: 2022-07-14

## 2022-07-14 VITALS
OXYGEN SATURATION: 96 % | BODY MASS INDEX: 40.62 KG/M2 | SYSTOLIC BLOOD PRESSURE: 130 MMHG | WEIGHT: 215 LBS | DIASTOLIC BLOOD PRESSURE: 86 MMHG | HEART RATE: 72 BPM

## 2022-07-14 DIAGNOSIS — I10 PRIMARY HYPERTENSION: ICD-10-CM

## 2022-07-14 DIAGNOSIS — H65.93 MIDDLE EAR EFFUSION, BILATERAL: Primary | ICD-10-CM

## 2022-07-14 DIAGNOSIS — I50.9 CONGESTIVE HEART FAILURE, UNSPECIFIED HF CHRONICITY, UNSPECIFIED HEART FAILURE TYPE: ICD-10-CM

## 2022-07-14 PROCEDURE — 96372 THER/PROPH/DIAG INJ SC/IM: CPT | Performed by: FAMILY MEDICINE

## 2022-07-14 PROCEDURE — 99214 OFFICE O/P EST MOD 30 MIN: CPT | Performed by: FAMILY MEDICINE

## 2022-07-14 RX ORDER — TRIAMCINOLONE ACETONIDE 40 MG/ML
80 INJECTION, SUSPENSION INTRA-ARTICULAR; INTRAMUSCULAR ONCE
Status: COMPLETED | OUTPATIENT
Start: 2022-07-14 | End: 2022-07-14

## 2022-07-14 RX ORDER — FLUTICASONE PROPIONATE 50 MCG
2 SPRAY, SUSPENSION (ML) NASAL DAILY
Qty: 18.2 ML | Refills: 5 | Status: SHIPPED | OUTPATIENT
Start: 2022-07-14 | End: 2023-03-23

## 2022-07-14 RX ADMIN — TRIAMCINOLONE ACETONIDE 80 MG: 40 INJECTION, SUSPENSION INTRA-ARTICULAR; INTRAMUSCULAR at 14:54

## 2022-07-14 NOTE — PROGRESS NOTES
Date: 2022   Patient Name: Alina Tilley  : 1955   MRN: [id number]     Chief Complaint:    Chief Complaint   Patient presents with   • Follow-up     Med recheck   • Ear Fullness     Right ear fullness and sloshing sound   • Balance Issues     Noticed balance issues and wanted to discuss       History of Present Illness: Alina Tilley is a 67 y.o. female who is here today for Otalgia and dizziness with rapid head movement.  She reports it started in the past week and she is feeling fullness in her right ear as well as a sloshing crackling sound.  She reports when she quickly turns her head to either side she feels unsteady and like she needs to grab onto something.  She denies nausea or vomiting.    The patient also reports that when she last saw her pain management physician that they recommended she ask about a medication for her heart although she is unsure what the medication name was.  She does report a history of congestive heart failure diagnosed after hospitalization for COVID.  She thinks that she saw a cardiologist a couple times after but is unable to give me any information about those visits and we do not have anything in her chart regarding cardiology consultation.  She would like to see a cardiologist to reassess CHF.  She denies orthopnea or worsening lower extremity edema.           Review of Systems:   Review of Systems   Constitutional: Negative for chills, fatigue and fever.   HENT: Positive for ear pain.    Respiratory: Negative for cough and shortness of breath.    Cardiovascular: Negative for chest pain and palpitations.   Gastrointestinal: Negative for abdominal pain, constipation, diarrhea, nausea and vomiting.   Musculoskeletal: Positive for arthralgias and back pain. Negative for myalgias.   Neurological: Positive for dizziness. Negative for headache.   Psychiatric/Behavioral: Negative for depressed mood. The patient is not nervous/anxious.        Past Medical History:    Past Medical History:   Diagnosis Date   • Anxiety    • Arthritis    • Common migraine    • Congestive heart failure (CHF) (HCC)    • Esophageal reflux    • Hearing problem    • IBS (irritable bowel syndrome)    • Insomnia    • Neurotic depression    • Obesity    • Overactive bladder    • Thyroiditis    • Vision problems        Past Surgical History:   Past Surgical History:   Procedure Laterality Date   • APPENDECTOMY     • HERNIA REPAIR     • HYSTERECTOMY     • KIDNEY SURGERY     • LAPAROSCOPIC TUBAL LIGATION     • TONSILLECTOMY         Family History:   Family History   Problem Relation Age of Onset   • Osteoporosis Mother    • Osteoarthritis Mother    • Hypertension Father    • Mental illness Father    • Alcohol abuse Father    • Depression Father    • Hypertension Brother    • Mental illness Brother    • Alcohol abuse Brother    • Obesity Other    • Irritable bowel syndrome Other    • Mental illness Other    • Stroke Other    • Diabetes Other    • Cancer Other        Social History:   Social History     Socioeconomic History   • Marital status: Single   Tobacco Use   • Smoking status: Never Smoker   • Smokeless tobacco: Never Used   Vaping Use   • Vaping Use: Never used   Substance and Sexual Activity   • Alcohol use: Never   • Drug use: Never   • Sexual activity: Defer       Medications:     Current Outpatient Medications:   •  amLODIPine (NORVASC) 5 MG tablet, Take 1 tablet by mouth Daily., Disp: 90 tablet, Rfl: 1  •  busPIRone (BUSPAR) 30 MG tablet, Take 1 tablet by mouth Every 12 (Twelve) Hours., Disp: 180 tablet, Rfl: 1  •  carvedilol (COREG) 25 MG tablet, Take 1 tablet by mouth 2 (Two) Times a Day With Meals., Disp: 180 tablet, Rfl: 1  •  diazePAM (VALIUM) 5 MG tablet, TAKE ONE TABLET BY MOUTH THREE TIMES A DAY AS NEEDED FOR ANXIETY, Disp: 90 tablet, Rfl: 2  •  DULoxetine (CYMBALTA) 30 MG capsule, Take 4 capsules by mouth Daily., Disp: 120 capsule, Rfl: 1  •  esomeprazole (nexIUM) 40 MG capsule, Take 1  capsule by mouth Every Morning Before Breakfast., Disp: 90 capsule, Rfl: 1  •  estradiol (ESTRACE) 2 MG tablet, Take 1 tablet by mouth Daily., Disp: 90 tablet, Rfl: 1  •  hydrOXYzine (ATARAX) 50 MG tablet, Take 1 tablet by mouth Every 6 (Six) Hours., Disp: 120 tablet, Rfl: 1  •  ibuprofen (ADVIL,MOTRIN) 600 MG tablet, Take 1 tablet by mouth Every 8 (Eight) Hours As Needed for Mild Pain  or Moderate Pain ., Disp: 90 tablet, Rfl: 1  •  levothyroxine (SYNTHROID, LEVOTHROID) 50 MCG tablet, Take 1 tablet by mouth Daily., Disp: 90 tablet, Rfl: 1  •  losartan (COZAAR) 50 MG tablet, Take 1 tablet by mouth Daily., Disp: 90 tablet, Rfl: 1  •  oxybutynin XL (DITROPAN-XL) 10 MG 24 hr tablet, Take 1 tablet by mouth Daily., Disp: 90 tablet, Rfl: 1  •  pregabalin (LYRICA) 50 MG capsule, Take 1 capsule by mouth 2 (Two) Times a Day., Disp: 60 capsule, Rfl: 2  •  QUEtiapine (SEROquel) 400 MG tablet, Take 1 tablet by mouth Every 12 (Twelve) Hours., Disp: 180 tablet, Rfl: 1  •  tiZANidine (ZANAFLEX) 4 MG tablet, Take 1 tablet by mouth Every 6 (Six) Hours., Disp: 120 tablet, Rfl: 1  •  fluticasone (Flonase) 50 MCG/ACT nasal spray, 2 sprays into the nostril(s) as directed by provider Daily., Disp: 18.2 mL, Rfl: 5  •  HYDROcodone-acetaminophen (NORCO) 7.5-325 MG per tablet, Take 1 tablet by mouth Every 6 (Six) Hours., Disp: , Rfl:     Current Facility-Administered Medications:   •  triamcinolone acetonide (KENALOG-40) injection 80 mg, 80 mg, Intramuscular, Once, Avis Mejias DO    Allergies:   Allergies   Allergen Reactions   • Aripiprazole Unknown - Low Severity   • Mirabegron Unknown - Low Severity   • Tramadol Unknown - Low Severity       PHQ-2 Total Score:     PHQ-9 Total Score:       Physical Exam:  Vital Signs:   Vitals:    07/14/22 1408   BP: 130/86   BP Location: Left arm   Patient Position: Sitting   Cuff Size: Adult   Pulse: 72   SpO2: 96%   Weight: 97.5 kg (215 lb)     Body mass index is 40.62 kg/m².      Physical Exam  Vitals and nursing note reviewed.   Constitutional:       Comments: Chronically ill-appearing   HENT:      Right Ear: A middle ear effusion is present.      Left Ear: A middle ear effusion is present.   Cardiovascular:      Rate and Rhythm: Normal rate and regular rhythm.      Heart sounds: Normal heart sounds. No murmur heard.  Pulmonary:      Effort: Pulmonary effort is normal.      Breath sounds: Normal breath sounds. No wheezing.   Neurological:      Mental Status: She is alert and oriented to person, place, and time. Mental status is at baseline.   Psychiatric:         Mood and Affect: Mood normal.         Behavior: Behavior normal.           Assessment/Plan:   Diagnoses and all orders for this visit:    1. Middle ear effusion, bilateral (Primary)  Assessment & Plan:  Kenalog in office today and Rx Flonase for the patient to use at home.  She will call if symptoms are persistent or worsening.    Orders:  -     fluticasone (Flonase) 50 MCG/ACT nasal spray; 2 sprays into the nostril(s) as directed by provider Daily.  Dispense: 18.2 mL; Refill: 5  -     triamcinolone acetonide (KENALOG-40) injection 80 mg    2. Congestive heart failure, unspecified HF chronicity, unspecified heart failure type (HCC)  Assessment & Plan:  Congestive heart failure due to viral infection.  Heart failure is unchanged.    Continue current medications.  Refer to Cardiology.  Heart failure will be reassessed in 6 months.    Orders:  -     Ambulatory Referral to Cardiology    3. Primary hypertension  Assessment & Plan:  Referred to cardiology as above    Orders:  -     Ambulatory Referral to Cardiology         Follow Up:   Return in about 2 months (around 9/14/2022) for Med Recheck .    Avis Mejias DO  [facility]

## 2022-07-14 NOTE — ASSESSMENT & PLAN NOTE
Kenalog in office today and Rx Flonase for the patient to use at home.  She will call if symptoms are persistent or worsening.

## 2022-07-14 NOTE — ASSESSMENT & PLAN NOTE
Congestive heart failure due to viral infection.  Heart failure is unchanged.    Continue current medications.  Refer to Cardiology.  Heart failure will be reassessed in 6 months.

## 2022-07-21 DIAGNOSIS — F41.1 GAD (GENERALIZED ANXIETY DISORDER): ICD-10-CM

## 2022-07-21 RX ORDER — DULOXETIN HYDROCHLORIDE 30 MG/1
CAPSULE, DELAYED RELEASE ORAL
Qty: 120 CAPSULE | Refills: 1 | Status: SHIPPED | OUTPATIENT
Start: 2022-07-21 | End: 2022-09-20

## 2022-07-25 ENCOUNTER — TELEPHONE (OUTPATIENT)
Dept: FAMILY MEDICINE CLINIC | Facility: CLINIC | Age: 67
End: 2022-07-25

## 2022-07-25 NOTE — TELEPHONE ENCOUNTER
Caller: Alina Tilley    Relationship: Self    Best call back number: [phone]     What form or medical record are you requesting: HANDICAP PLACARD    Who is requesting this form or medical record from you: [employer]    How would you like to receive the form or medical records (pick-up, mail, fax):  If fax, what is the fax number:  If mail, what is the address: [address] [facility] [address]  If pick-up, provide patient with address and location details    Timeframe paperwork needed: AS SOON AS POSSIBLE    Additional notes: PATIENT CALLED IN STATED HER VEHICLE WAS SALVAGED AND HER HANDICAP PLACARD WAS LEFT IN THE CAR SHE IS REQUESTING HER PCP TO FILL OUT OR WRITE A LETTER STATING PATIENT IS DISABLED AND NEEDS HANDICAP PLACARD REPLACED

## 2022-08-17 ENCOUNTER — TELEPHONE (OUTPATIENT)
Dept: FAMILY MEDICINE CLINIC | Facility: CLINIC | Age: 67
End: 2022-08-17

## 2022-08-17 DIAGNOSIS — G89.4 CHRONIC PAIN SYNDROME: ICD-10-CM

## 2022-08-17 DIAGNOSIS — E03.9 ACQUIRED HYPOTHYROIDISM: ICD-10-CM

## 2022-08-17 NOTE — TELEPHONE ENCOUNTER
Caller: Alina Tilley    Relationship: Self    Best call back number: [phone]    Requested Prescriptions:   Requested Prescriptions     Pending Prescriptions Disp Refills   • levothyroxine (SYNTHROID, LEVOTHROID) 50 MCG tablet 90 tablet 1     Sig: Take 1 tablet by mouth Daily.   • tiZANidine (ZANAFLEX) 4 MG tablet 120 tablet 1     Sig: Take 1 tablet by mouth Every 6 (Six) Hours.   • pregabalin (LYRICA) 50 MG capsule 60 capsule 2     Sig: Take 1 capsule by mouth 2 (Two) Times a Day.        Pharmacy where request should be sent: [facility] [address] - [phone]  - [phone] FX     Additional details provided by patient:     Does the patient have less than a 3 day supply:  [x] Yes  [] No    Kianna Bryan Rep   08/17/22 12:19 EDT

## 2022-08-19 ENCOUNTER — TELEPHONE (OUTPATIENT)
Dept: FAMILY MEDICINE CLINIC | Facility: CLINIC | Age: 67
End: 2022-08-19

## 2022-08-19 DIAGNOSIS — F41.1 GAD (GENERALIZED ANXIETY DISORDER): ICD-10-CM

## 2022-08-19 RX ORDER — QUETIAPINE FUMARATE 400 MG/1
400 TABLET, FILM COATED ORAL EVERY 12 HOURS SCHEDULED
Qty: 180 TABLET | Refills: 1 | Status: CANCELLED | OUTPATIENT
Start: 2022-08-19

## 2022-08-19 NOTE — TELEPHONE ENCOUNTER
Caller: Alina Tilley    Relationship: Self    Best call back number: [phone]    Requested Prescriptions:   Requested Prescriptions     Pending Prescriptions Disp Refills   • QUEtiapine (SEROquel) 400 MG tablet 180 tablet 1     Sig: Take 1 tablet by mouth Every 12 (Twelve) Hours.        Pharmacy where request should be sent: [facility] [address] - [phone]  - [phone] FX     Additional details provided by patient: PATIENT OUT OF MEDICATION.      Does the patient have less than a 3 day supply:  [x] Yes  [] No    Abi Keisha   08/19/22 10:14 EDT

## 2022-08-19 NOTE — TELEPHONE ENCOUNTER
PATIENT CALLED TO REPORT SHE IS CONSTIPATED.  HAS TRIED OVER THE COUNTER WITHOUT SUCCESS.  WHAT TO DO?  TRIED ENEMA.  ALSO A LOT OF URINE OUTPUT.  IS THERE A PRESCRIPTION LAXATIVE.      PLEASE CALL [phone]

## 2022-08-22 RX ORDER — TIZANIDINE 4 MG/1
4 TABLET ORAL EVERY 6 HOURS
Qty: 120 TABLET | Refills: 1 | Status: SHIPPED | OUTPATIENT
Start: 2022-08-22 | End: 2022-12-01 | Stop reason: SDUPTHER

## 2022-08-22 RX ORDER — LEVOTHYROXINE SODIUM 0.05 MG/1
50 TABLET ORAL DAILY
Qty: 90 TABLET | Refills: 1 | Status: SHIPPED | OUTPATIENT
Start: 2022-08-22 | End: 2022-11-15 | Stop reason: SDUPTHER

## 2022-08-22 RX ORDER — PREGABALIN 50 MG/1
50 CAPSULE ORAL 2 TIMES DAILY
Qty: 60 CAPSULE | Refills: 2 | Status: SHIPPED | OUTPATIENT
Start: 2022-08-22 | End: 2022-11-15 | Stop reason: SDUPTHER

## 2022-08-22 RX ORDER — QUETIAPINE FUMARATE 400 MG/1
400 TABLET, FILM COATED ORAL EVERY 12 HOURS SCHEDULED
Qty: 180 TABLET | Refills: 1 | Status: SHIPPED | OUTPATIENT
Start: 2022-08-22 | End: 2022-11-15

## 2022-09-20 DIAGNOSIS — F41.1 GAD (GENERALIZED ANXIETY DISORDER): ICD-10-CM

## 2022-09-20 RX ORDER — DULOXETIN HYDROCHLORIDE 30 MG/1
CAPSULE, DELAYED RELEASE ORAL
Qty: 120 CAPSULE | Refills: 1 | Status: SHIPPED | OUTPATIENT
Start: 2022-09-20 | End: 2022-11-15

## 2022-09-28 ENCOUNTER — READMISSION MANAGEMENT (OUTPATIENT)
Dept: CALL CENTER | Facility: HOSPITAL | Age: 67
End: 2022-09-28

## 2022-09-28 NOTE — OUTREACH NOTE
Prep Survey    Flowsheet Row Responses   [facility] facility patient discharged from? Non-BH   Is LACE score < 7 ? Non-BH Discharge   Emergency Room discharge w/ pulse ox? No   Eligibility [facility]    Date of Admission 09/09/22   Date of Discharge 09/28/22   Discharge diagnosis falls, weakness, and fatigue    Does the patient have one of the following disease processes/diagnoses(primary or secondary)? Other   Prep survey completed? Yes          AJ COYNE - Registered Nurse

## 2022-09-29 ENCOUNTER — TRANSITIONAL CARE MANAGEMENT TELEPHONE ENCOUNTER (OUTPATIENT)
Dept: CALL CENTER | Facility: HOSPITAL | Age: 67
End: 2022-09-29

## 2022-09-29 NOTE — OUTREACH NOTE
Call Center TCM Note    Flowsheet Row Responses   [facility] patient discharged from? Non-BH   Does the patient have one of the following disease processes/diagnoses(primary or secondary)? Other   TCM attempt successful? No  [Zina-Dtr on Verbal Release]   Unsuccessful attempts Attempt 1          Clarisa Roque RN    9/29/2022, 12:08 EDT

## 2022-09-29 NOTE — OUTREACH NOTE
Call Center TCM Note    Flowsheet Row Responses   [facility] patient discharged from? Non-BH   Does the patient have one of the following disease processes/diagnoses(primary or secondary)? Other   TCM attempt successful? No   Unsuccessful attempts Attempt 2          Monique Monge RN    9/29/2022, 17:45 EDT

## 2022-09-30 ENCOUNTER — TRANSITIONAL CARE MANAGEMENT TELEPHONE ENCOUNTER (OUTPATIENT)
Dept: CALL CENTER | Facility: HOSPITAL | Age: 67
End: 2022-09-30

## 2022-11-10 ENCOUNTER — READMISSION MANAGEMENT (OUTPATIENT)
Dept: CALL CENTER | Facility: HOSPITAL | Age: 67
End: 2022-11-10

## 2022-11-10 ENCOUNTER — TELEPHONE (OUTPATIENT)
Dept: FAMILY MEDICINE CLINIC | Facility: CLINIC | Age: 67
End: 2022-11-10

## 2022-11-10 NOTE — TELEPHONE ENCOUNTER
Caller: KATY DIALLO    Relationship to patient:     Best call back number: [phone]    New or established patient?  [] New  [x] Established    Date of discharge: 11-11-22    Facility discharged from: [facility]    Diagnosis/Symptoms:      Length of stay (If applicable):      Specialty Only: Did you see a [facility] provider?    [] Yes  [] No          KATY FRAN[place], [place] [id number]  rito nunes

## 2022-11-10 NOTE — OUTREACH NOTE
Prep Survey    Flowsheet Row Responses   [facility] facility patient discharged from? Non-BH   Is LACE score < 7 ? Non-BH Discharge   Emergency Room discharge w/ pulse ox? No   Eligibility [facility]   Date of Discharge 11/11/22   Discharge diagnosis unknown   Does the patient have one of the following disease processes/diagnoses(primary or secondary)? Other   Prep survey completed? Yes          GORDON LANZA - Registered Nurse

## 2022-11-13 DIAGNOSIS — Z79.890 HORMONE REPLACEMENT THERAPY (HRT): ICD-10-CM

## 2022-11-14 ENCOUNTER — TRANSITIONAL CARE MANAGEMENT TELEPHONE ENCOUNTER (OUTPATIENT)
Dept: CALL CENTER | Facility: HOSPITAL | Age: 67
End: 2022-11-14

## 2022-11-14 RX ORDER — ESTRADIOL 2 MG/1
TABLET ORAL
Qty: 90 TABLET | Refills: 1 | Status: SHIPPED | OUTPATIENT
Start: 2022-11-14 | End: 2022-11-15 | Stop reason: SDUPTHER

## 2022-11-14 NOTE — OUTREACH NOTE
Call Center TCM Note    Flowsheet Row Responses   [facility] patient discharged from? Non-BH   Does the patient have one of the following disease processes/diagnoses(primary or secondary)? Other   TCM attempt successful? No   Unsuccessful attempts Attempt 2          Monique Monge RN    11/14/2022, 15:16 EST

## 2022-11-14 NOTE — OUTREACH NOTE
Call Center TCM Note    Flowsheet Row Responses   [facility] patient discharged from? Non-BH   Does the patient have one of the following disease processes/diagnoses(primary or secondary)? Other   TCM attempt successful? No   Unsuccessful attempts Attempt 1          Monique Monge RN    11/14/2022, 11:39 EST

## 2022-11-15 ENCOUNTER — TELEMEDICINE (OUTPATIENT)
Dept: FAMILY MEDICINE CLINIC | Facility: CLINIC | Age: 67
End: 2022-11-15

## 2022-11-15 ENCOUNTER — TRANSITIONAL CARE MANAGEMENT TELEPHONE ENCOUNTER (OUTPATIENT)
Dept: CALL CENTER | Facility: HOSPITAL | Age: 67
End: 2022-11-15

## 2022-11-15 VITALS — HEIGHT: 61 IN | BODY MASS INDEX: 35.87 KG/M2 | WEIGHT: 190 LBS

## 2022-11-15 DIAGNOSIS — N30.00 ACUTE CYSTITIS WITHOUT HEMATURIA: ICD-10-CM

## 2022-11-15 DIAGNOSIS — N32.81 OAB (OVERACTIVE BLADDER): ICD-10-CM

## 2022-11-15 DIAGNOSIS — G93.40 ACUTE ENCEPHALOPATHY: Primary | ICD-10-CM

## 2022-11-15 DIAGNOSIS — I10 PRIMARY HYPERTENSION: ICD-10-CM

## 2022-11-15 DIAGNOSIS — F41.1 GAD (GENERALIZED ANXIETY DISORDER): ICD-10-CM

## 2022-11-15 DIAGNOSIS — G89.4 CHRONIC PAIN SYNDROME: ICD-10-CM

## 2022-11-15 DIAGNOSIS — Z79.890 HORMONE REPLACEMENT THERAPY (HRT): ICD-10-CM

## 2022-11-15 DIAGNOSIS — E03.9 ACQUIRED HYPOTHYROIDISM: ICD-10-CM

## 2022-11-15 PROCEDURE — 99214 OFFICE O/P EST MOD 30 MIN: CPT | Performed by: FAMILY MEDICINE

## 2022-11-15 RX ORDER — LOSARTAN POTASSIUM 50 MG/1
50 TABLET ORAL DAILY
Qty: 90 TABLET | Refills: 1 | Status: SHIPPED | OUTPATIENT
Start: 2022-11-15 | End: 2023-02-06

## 2022-11-15 RX ORDER — OXYBUTYNIN CHLORIDE 10 MG/1
10 TABLET, EXTENDED RELEASE ORAL DAILY
Qty: 90 TABLET | Refills: 1 | Status: SHIPPED | OUTPATIENT
Start: 2022-11-15 | End: 2022-12-01 | Stop reason: SDUPTHER

## 2022-11-15 RX ORDER — AMLODIPINE BESYLATE 5 MG/1
5 TABLET ORAL DAILY
Qty: 90 TABLET | Refills: 1 | Status: SHIPPED | OUTPATIENT
Start: 2022-11-15 | End: 2023-03-01

## 2022-11-15 RX ORDER — BUSPIRONE HYDROCHLORIDE 30 MG/1
30 TABLET ORAL EVERY 12 HOURS SCHEDULED
Qty: 180 TABLET | Refills: 1 | Status: SHIPPED | OUTPATIENT
Start: 2022-11-15 | End: 2023-02-21

## 2022-11-15 RX ORDER — ESTRADIOL 2 MG/1
2 TABLET ORAL DAILY
Qty: 90 TABLET | Refills: 1 | Status: SHIPPED | OUTPATIENT
Start: 2022-11-15 | End: 2022-12-01 | Stop reason: SDUPTHER

## 2022-11-15 RX ORDER — LEVOTHYROXINE SODIUM 0.05 MG/1
50 TABLET ORAL DAILY
Qty: 90 TABLET | Refills: 1 | Status: SHIPPED | OUTPATIENT
Start: 2022-11-15 | End: 2023-03-21

## 2022-11-15 RX ORDER — CARVEDILOL 25 MG/1
25 TABLET ORAL 2 TIMES DAILY WITH MEALS
Qty: 180 TABLET | Refills: 1 | Status: SHIPPED | OUTPATIENT
Start: 2022-11-15 | End: 2023-03-21

## 2022-11-15 RX ORDER — DULOXETIN HYDROCHLORIDE 60 MG/1
120 CAPSULE, DELAYED RELEASE ORAL DAILY
Qty: 180 CAPSULE | Refills: 1 | Status: SHIPPED | OUTPATIENT
Start: 2022-11-15 | End: 2023-02-14

## 2022-11-15 RX ORDER — DIAZEPAM 5 MG/1
5 TABLET ORAL 3 TIMES DAILY PRN
Qty: 90 TABLET | Refills: 2 | Status: SHIPPED | OUTPATIENT
Start: 2022-11-15 | End: 2023-03-07 | Stop reason: SDUPTHER

## 2022-11-15 RX ORDER — HYDROXYZINE PAMOATE 50 MG/1
CAPSULE ORAL
COMMUNITY
Start: 2022-08-17 | End: 2022-11-15

## 2022-11-15 RX ORDER — SULFAMETHOXAZOLE AND TRIMETHOPRIM 800; 160 MG/1; MG/1
TABLET ORAL
COMMUNITY
Start: 2022-10-05 | End: 2022-11-15

## 2022-11-15 RX ORDER — DIAZEPAM 5 MG/1
TABLET ORAL EVERY 12 HOURS SCHEDULED
COMMUNITY
End: 2022-11-15

## 2022-11-15 RX ORDER — PREGABALIN 50 MG/1
50 CAPSULE ORAL 2 TIMES DAILY
Qty: 60 CAPSULE | Refills: 2 | Status: SHIPPED | OUTPATIENT
Start: 2022-11-15 | End: 2023-03-07 | Stop reason: SDUPTHER

## 2022-11-15 NOTE — OUTREACH NOTE
Call Center TCM Note    Flowsheet Row Responses   [facility] patient discharged from? Non-BH   Does the patient have one of the following disease processes/diagnoses(primary or secondary)? Other   TCM attempt successful? No   Unsuccessful attempts Attempt 3  [Zina on verbal release-no answer]          Earline Romo RN    11/15/2022, 09:10 EST

## 2022-11-15 NOTE — PROGRESS NOTES
Date: 11/15/2022   Patient Name: Alina Tilley  : 1955   MRN: [id number]     Chief Complaint:    Chief Complaint   Patient presents with   • Med Refill     Med renewals   • Follow-up     Patient would like to talk about all that has happened in the last couple months.       History of Present Illness: Alina Tilley is a 67 y.o. female who is here today to follow up for Recent hospitalizations.  She was most recently hospitalized at [facility] on 10/24/2022 due to to acute metabolic encephalopathy secondary to UTI.  She was initially placed on IV antibiotics and was discharged on 10/27/2022 to [facility] to continue rehab and physical therapy.  She was discharged from [facility] but is unsure of the exact date.  She has been home for a few days and although she continues to feel weak and unsteady she is overall doing well.  She declines any additional physical therapy at this time.  Hallucinations have resolved.  She is requesting refill on all medications including controlled medications which she reports continue to work well without side effects.           Review of Systems:   Review of Systems   Constitutional: Positive for fatigue. Negative for chills and fever.   Respiratory: Negative for cough and shortness of breath.    Cardiovascular: Negative for chest pain and palpitations.   Gastrointestinal: Negative for abdominal pain, constipation, diarrhea, nausea and vomiting.   Musculoskeletal: Positive for back pain and neck pain. Negative for myalgias.   Neurological: Positive for weakness. Negative for dizziness and headache.   Psychiatric/Behavioral: Negative for depressed mood. The patient is not nervous/anxious.        Past Medical History:   Past Medical History:   Diagnosis Date   • Anxiety    • Arthritis    • Common migraine    • Congestive heart failure (CHF) ([facility])    • Esophageal reflux    • Hearing problem    • IBS (irritable bowel syndrome)    • Insomnia     • Neurotic depression    • Obesity    • Overactive bladder    • Thyroiditis    • Vision problems        Past Surgical History:   Past Surgical History:   Procedure Laterality Date   • APPENDECTOMY     • HERNIA REPAIR     • HYSTERECTOMY     • KIDNEY SURGERY     • LAPAROSCOPIC TUBAL LIGATION     • TONSILLECTOMY         Family History:   Family History   Problem Relation Age of Onset   • Osteoporosis Mother    • Osteoarthritis Mother    • Hypertension Father    • Mental illness Father    • Alcohol abuse Father    • Depression Father    • Hypertension Brother    • Mental illness Brother    • Alcohol abuse Brother    • Obesity Other    • Irritable bowel syndrome Other    • Mental illness Other    • Stroke Other    • Diabetes Other    • Cancer Other        Social History:   Social History     Socioeconomic History   • Marital status: Single   Tobacco Use   • Smoking status: Never   • Smokeless tobacco: Never   Vaping Use   • Vaping Use: Never used   Substance and Sexual Activity   • Alcohol use: Never   • Drug use: Never   • Sexual activity: Defer       Medications:     Current Outpatient Medications:   •  amLODIPine (NORVASC) 5 MG tablet, Take 1 tablet by mouth Daily., Disp: 90 tablet, Rfl: 1  •  busPIRone (BUSPAR) 30 MG tablet, Take 1 tablet by mouth Every 12 (Twelve) Hours., Disp: 180 tablet, Rfl: 1  •  carvedilol (COREG) 25 MG tablet, Take 1 tablet by mouth 2 (Two) Times a Day With Meals., Disp: 180 tablet, Rfl: 1  •  diazePAM (VALIUM) 5 MG tablet, Take 1 tablet by mouth 3 (Three) Times a Day As Needed for Anxiety. for anxiety, Disp: 90 tablet, Rfl: 2  •  esomeprazole (nexIUM) 40 MG capsule, Take 1 capsule by mouth Every Morning Before Breakfast., Disp: 90 capsule, Rfl: 1  •  estradiol (ESTRACE) 2 MG tablet, Take 1 tablet by mouth Daily., Disp: 90 tablet, Rfl: 1  •  fluticasone (Flonase) 50 MCG/ACT nasal spray, 2 sprays into the nostril(s) as directed by provider Daily., Disp: 18.2 mL, Rfl: 5  •  hydrOXYzine  "(ATARAX) 50 MG tablet, Take 1 tablet by mouth Every 6 (Six) Hours., Disp: 120 tablet, Rfl: 1  •  ibuprofen (ADVIL,MOTRIN) 600 MG tablet, Take 1 tablet by mouth Every 8 (Eight) Hours As Needed for Mild Pain  or Moderate Pain ., Disp: 90 tablet, Rfl: 1  •  levothyroxine (SYNTHROID, LEVOTHROID) 50 MCG tablet, Take 1 tablet by mouth Daily., Disp: 90 tablet, Rfl: 1  •  losartan (COZAAR) 50 MG tablet, Take 1 tablet by mouth Daily., Disp: 90 tablet, Rfl: 1  •  nystatin (MYCOSTATIN) 100,000 unit/mL suspension, , Disp: , Rfl:   •  oxybutynin XL (DITROPAN-XL) 10 MG 24 hr tablet, Take 1 tablet by mouth Daily., Disp: 90 tablet, Rfl: 1  •  pregabalin (LYRICA) 50 MG capsule, Take 1 capsule by mouth 2 (Two) Times a Day., Disp: 60 capsule, Rfl: 2  •  tiZANidine (ZANAFLEX) 4 MG tablet, Take 1 tablet by mouth Every 6 (Six) Hours., Disp: 120 tablet, Rfl: 1  •  DULoxetine (CYMBALTA) 60 MG capsule, Take 2 capsules by mouth Daily., Disp: 180 capsule, Rfl: 1    Allergies:   Allergies   Allergen Reactions   • Aripiprazole Unknown - Low Severity   • Mirabegron Unknown - Low Severity   • Tramadol Unknown - Low Severity       PHQ-2 Total Score:     PHQ-9 Total Score:       Physical Exam:  Vital Signs:   Vitals:    11/15/22 1025   Weight: 86.2 kg (190 lb)   Height: 154.9 cm (61\")     Body mass index is 35.9 kg/m².     Physical Exam  Vitals and nursing note reviewed.   Pulmonary:      Effort: Pulmonary effort is normal.   Neurological:      Mental Status: She is alert and oriented to person, place, and time.   Psychiatric:         Mood and Affect: Mood normal.         Behavior: Behavior normal.           Assessment/Plan:   Diagnoses and all orders for this visit:    1. Acute encephalopathy (Primary)  Assessment & Plan:  Resolved after treatment for UTI      2. Acute cystitis without hematuria  Assessment & Plan:  Resolved after completing p.o. antibiotics      3. Primary hypertension  -     amLODIPine (NORVASC) 5 MG tablet; Take 1 tablet by " mouth Daily.  Dispense: 90 tablet; Refill: 1  -     carvedilol (COREG) 25 MG tablet; Take 1 tablet by mouth 2 (Two) Times a Day With Meals.  Dispense: 180 tablet; Refill: 1  -     losartan (COZAAR) 50 MG tablet; Take 1 tablet by mouth Daily.  Dispense: 90 tablet; Refill: 1    4. BARBARA (generalized anxiety disorder)  Assessment & Plan:  Diazepam refilled.  Adverse effects discussed.  Avoid alcohol, driving, or operating machinery while taking medications.      Orders:  -     busPIRone (BUSPAR) 30 MG tablet; Take 1 tablet by mouth Every 12 (Twelve) Hours.  Dispense: 180 tablet; Refill: 1  -     diazePAM (VALIUM) 5 MG tablet; Take 1 tablet by mouth 3 (Three) Times a Day As Needed for Anxiety. for anxiety  Dispense: 90 tablet; Refill: 2    5. Hormone replacement therapy (HRT)  -     estradiol (ESTRACE) 2 MG tablet; Take 1 tablet by mouth Daily.  Dispense: 90 tablet; Refill: 1    6. Acquired hypothyroidism  -     levothyroxine (SYNTHROID, LEVOTHROID) 50 MCG tablet; Take 1 tablet by mouth Daily.  Dispense: 90 tablet; Refill: 1    7. OAB (overactive bladder)  Assessment & Plan:  Stable on oxybutynin    Orders:  -     oxybutynin XL (DITROPAN-XL) 10 MG 24 hr tablet; Take 1 tablet by mouth Daily.  Dispense: 90 tablet; Refill: 1    8. Chronic pain syndrome  Assessment & Plan:  Lyrica refilled.  Adverse effects discussed.  Avoid alcohol, driving, or operating machinery while taking medications.      Orders:  -     pregabalin (LYRICA) 50 MG capsule; Take 1 capsule by mouth 2 (Two) Times a Day.  Dispense: 60 capsule; Refill: 2    Other orders  -     DULoxetine (CYMBALTA) 60 MG capsule; Take 2 capsules by mouth Daily.  Dispense: 180 capsule; Refill: 1         Follow Up:   Return in about 1 month (around 12/15/2022) for Med Recheck.    Avis Mejias DO  [facility]

## 2022-11-15 NOTE — ASSESSMENT & PLAN NOTE
Lyrica refilled.  Adverse effects discussed.  Avoid alcohol, driving, or operating machinery while taking medications.

## 2022-12-01 ENCOUNTER — TELEPHONE (OUTPATIENT)
Dept: FAMILY MEDICINE CLINIC | Facility: CLINIC | Age: 67
End: 2022-12-01

## 2022-12-01 DIAGNOSIS — G89.4 CHRONIC PAIN SYNDROME: ICD-10-CM

## 2022-12-01 DIAGNOSIS — F41.1 GAD (GENERALIZED ANXIETY DISORDER): ICD-10-CM

## 2022-12-01 DIAGNOSIS — Z79.890 HORMONE REPLACEMENT THERAPY (HRT): ICD-10-CM

## 2022-12-01 DIAGNOSIS — N32.81 OAB (OVERACTIVE BLADDER): ICD-10-CM

## 2022-12-01 RX ORDER — ESTRADIOL 2 MG/1
2 TABLET ORAL DAILY
Qty: 90 TABLET | Refills: 1 | Status: SHIPPED | OUTPATIENT
Start: 2022-12-01 | End: 2023-03-21

## 2022-12-01 RX ORDER — HYDROXYZINE 50 MG/1
50 TABLET, FILM COATED ORAL EVERY 6 HOURS SCHEDULED
Qty: 120 TABLET | Refills: 1 | Status: SHIPPED | OUTPATIENT
Start: 2022-12-01 | End: 2023-01-03

## 2022-12-01 RX ORDER — TIZANIDINE 4 MG/1
4 TABLET ORAL EVERY 6 HOURS
Qty: 120 TABLET | Refills: 1 | Status: SHIPPED | OUTPATIENT
Start: 2022-12-01 | End: 2023-01-16

## 2022-12-01 RX ORDER — OXYBUTYNIN CHLORIDE 10 MG/1
10 TABLET, EXTENDED RELEASE ORAL DAILY
Qty: 90 TABLET | Refills: 1 | Status: SHIPPED | OUTPATIENT
Start: 2022-12-01 | End: 2023-01-13 | Stop reason: SDUPTHER

## 2022-12-01 NOTE — TELEPHONE ENCOUNTER
Caller: Alina Tilley    Relationship: Self    Best call back number: [phone]    What medication are you requesting: SOMETHING FOR SYMPTOMS     What are your current symptoms: OVER ACTIVE BLADDER     How long have you been experiencing symptoms: SINCE AUGUST     Have you had these symptoms before:    [x] Yes  [] No    Have you been treated for these symptoms before:   [x] Yes  [] No    If a prescription is needed, what is your preferred pharmacy and phone number: [address] [phone] [facility] [phone]        Additional notes:

## 2022-12-01 NOTE — TELEPHONE ENCOUNTER
Caller: Alina Tilley    Relationship: Self    Best call back number: [phone]    Requested Prescriptions:   Requested Prescriptions     Pending Prescriptions Disp Refills   • tiZANidine (ZANAFLEX) 4 MG tablet 120 tablet 1     Sig: Take 1 tablet by mouth Every 6 (Six) Hours.   • hydrOXYzine (ATARAX) 50 MG tablet 120 tablet 1     Sig: Take 1 tablet by mouth Every 6 (Six) Hours.   • oxybutynin XL (DITROPAN-XL) 10 MG 24 hr tablet 90 tablet 1     Sig: Take 1 tablet by mouth Daily.   • estradiol (ESTRACE) 2 MG tablet 90 tablet 1     Sig: Take 1 tablet by mouth Daily.        Pharmacy where request should be sent: [address] [phone] [facility] [phone] FX     Additional details provided by patient:PATIENT IS OUT     Does the patient have less than a 3 day supply:  [x] Yes  [] No    Would you like a call back once the refill request has been completed: [x] Yes [] No    If the office needs to give you a call back, can they leave a voicemail: [] Yes [x] No    Cadance Dunaway, RegSched Rep   12/01/22 11:02 EST

## 2022-12-02 NOTE — TELEPHONE ENCOUNTER
Called patient and left message for patient to call back, she needs an appointment to be seen for overactive bladder.

## 2022-12-30 DIAGNOSIS — F41.1 GAD (GENERALIZED ANXIETY DISORDER): ICD-10-CM

## 2023-01-03 RX ORDER — HYDROXYZINE 50 MG/1
TABLET, FILM COATED ORAL
Qty: 120 TABLET | Refills: 1 | Status: SHIPPED | OUTPATIENT
Start: 2023-01-03 | End: 2023-03-21

## 2023-01-13 DIAGNOSIS — G89.4 CHRONIC PAIN SYNDROME: ICD-10-CM

## 2023-01-13 DIAGNOSIS — N32.81 OAB (OVERACTIVE BLADDER): ICD-10-CM

## 2023-01-13 NOTE — TELEPHONE ENCOUNTER
Caller: Alina Tilley    Relationship: Self    Best call back number: [phone]     Requested Prescriptions:   Requested Prescriptions     Pending Prescriptions Disp Refills   • oxybutynin XL (DITROPAN-XL) 10 MG 24 hr tablet 90 tablet 1     Sig: Take 1 tablet by mouth Daily.   • ibuprofen (ADVIL,MOTRIN) 600 MG tablet 90 tablet 1     Sig: Take 1 tablet by mouth Every 8 (Eight) Hours As Needed for Mild Pain or Moderate Pain.        Pharmacy where request should be sent: [address] [phone] [facility] [phone] FX     Additional details provided by patient:   PATIENT STATED THAT SHE HAS BEEN HAVING TO TAKE MEDICATION ( OXYBUTYNIN )  MORE OFTEN THEN PRESCRIBED DUE TO HELPING WITH OVERACTIVE BLADDER. PATIENT WOULD LIKE FOR MEDICATION TO BE INCREASED ON THE MG OR TO BE CALLED INTO THE PHARMACY HAS TWO TABLETS A DAY     Does the patient have less than a 3 day supply:  [x] Yes  [] No    Would you like a call back once the refill request has been completed: [x] Yes [] No    If the office needs to give you a call back, can they leave a voicemail: [x] Yes [] No    Kianna Quinn Rep   01/13/23 12:39 EST

## 2023-01-16 RX ORDER — IBUPROFEN 600 MG/1
600 TABLET ORAL EVERY 8 HOURS PRN
Qty: 90 TABLET | Refills: 1 | Status: SHIPPED | OUTPATIENT
Start: 2023-01-16 | End: 2023-02-21

## 2023-01-16 RX ORDER — TIZANIDINE 4 MG/1
TABLET ORAL
Qty: 120 TABLET | Refills: 1 | Status: SHIPPED | OUTPATIENT
Start: 2023-01-16 | End: 2023-03-21

## 2023-01-16 RX ORDER — OXYBUTYNIN CHLORIDE 10 MG/1
10 TABLET, EXTENDED RELEASE ORAL DAILY
Qty: 90 TABLET | Refills: 1 | Status: SHIPPED | OUTPATIENT
Start: 2023-01-16 | End: 2023-03-23

## 2023-02-03 ENCOUNTER — TELEPHONE (OUTPATIENT)
Dept: FAMILY MEDICINE CLINIC | Facility: CLINIC | Age: 68
End: 2023-02-03

## 2023-02-05 DIAGNOSIS — I10 PRIMARY HYPERTENSION: ICD-10-CM

## 2023-02-06 DIAGNOSIS — G89.4 CHRONIC PAIN SYNDROME: ICD-10-CM

## 2023-02-06 DIAGNOSIS — F41.1 GAD (GENERALIZED ANXIETY DISORDER): ICD-10-CM

## 2023-02-06 RX ORDER — PREGABALIN 50 MG/1
CAPSULE ORAL
Qty: 60 CAPSULE | Refills: 2 | OUTPATIENT
Start: 2023-02-06

## 2023-02-06 RX ORDER — DIAZEPAM 5 MG/1
TABLET ORAL
Qty: 90 TABLET | Refills: 2 | OUTPATIENT
Start: 2023-02-06

## 2023-02-06 RX ORDER — LOSARTAN POTASSIUM 50 MG/1
TABLET ORAL
Qty: 90 TABLET | Refills: 1 | Status: SHIPPED | OUTPATIENT
Start: 2023-02-06

## 2023-02-13 ENCOUNTER — TELEPHONE (OUTPATIENT)
Dept: FAMILY MEDICINE CLINIC | Facility: CLINIC | Age: 68
End: 2023-02-13

## 2023-02-13 NOTE — TELEPHONE ENCOUNTER
Caller: [facility] - [place], [place] - [address] [facility] - [phone]  - [phone] FX    Relationship to patient: Pharmacy    Best call back number: [phone]    Patient is needing: GIBSON - PHARMACY TECH - ADVISED HE NEEDS TO CLARIFY DIRECTIONS ON DULOXETINE 30 MG

## 2023-02-13 NOTE — TELEPHONE ENCOUNTER
NOTE: GIBSON FROM [facility] PH[facility]Y ALSO NEEDS CLARIFICATION ON QUETIAPINE 25MG AS WELL.

## 2023-02-14 RX ORDER — DULOXETIN HYDROCHLORIDE 60 MG/1
CAPSULE, DELAYED RELEASE ORAL
Qty: 180 CAPSULE | Refills: 1 | Status: SHIPPED | OUTPATIENT
Start: 2023-02-14

## 2023-02-20 DIAGNOSIS — G89.4 CHRONIC PAIN SYNDROME: ICD-10-CM

## 2023-02-20 DIAGNOSIS — F41.1 GAD (GENERALIZED ANXIETY DISORDER): ICD-10-CM

## 2023-02-21 RX ORDER — BUSPIRONE HYDROCHLORIDE 30 MG/1
TABLET ORAL
Qty: 180 TABLET | Refills: 1 | Status: SHIPPED | OUTPATIENT
Start: 2023-02-21

## 2023-02-21 RX ORDER — IBUPROFEN 600 MG/1
TABLET ORAL
Qty: 90 TABLET | Refills: 1 | Status: SHIPPED | OUTPATIENT
Start: 2023-02-21

## 2023-03-01 DIAGNOSIS — I10 PRIMARY HYPERTENSION: ICD-10-CM

## 2023-03-01 RX ORDER — AMLODIPINE BESYLATE 5 MG/1
TABLET ORAL
Qty: 90 TABLET | Refills: 1 | Status: SHIPPED | OUTPATIENT
Start: 2023-03-01

## 2023-03-07 DIAGNOSIS — F41.1 GAD (GENERALIZED ANXIETY DISORDER): ICD-10-CM

## 2023-03-07 DIAGNOSIS — G89.4 CHRONIC PAIN SYNDROME: ICD-10-CM

## 2023-03-07 RX ORDER — PREGABALIN 50 MG/1
50 CAPSULE ORAL 2 TIMES DAILY
Qty: 60 CAPSULE | Refills: 2 | Status: SHIPPED | OUTPATIENT
Start: 2023-03-07

## 2023-03-07 RX ORDER — DIAZEPAM 5 MG/1
TABLET ORAL
Qty: 90 TABLET | Refills: 2 | OUTPATIENT
Start: 2023-03-07

## 2023-03-07 RX ORDER — PREGABALIN 50 MG/1
CAPSULE ORAL
Qty: 60 CAPSULE | Refills: 2 | OUTPATIENT
Start: 2023-03-07

## 2023-03-07 RX ORDER — DIAZEPAM 5 MG/1
5 TABLET ORAL 3 TIMES DAILY PRN
Qty: 90 TABLET | Refills: 2 | Status: SHIPPED | OUTPATIENT
Start: 2023-03-07 | End: 2023-03-23

## 2023-03-07 NOTE — TELEPHONE ENCOUNTER
Caller: Alina Tilley    Relationship: Self    Best call back number: [phone]    Requested Prescriptions: QUETIAPINE 400 MG TABLET     Requested Prescriptions     Pending Prescriptions Disp Refills   • diazePAM (VALIUM) 5 MG tablet 90 tablet 2     Sig: Take 1 tablet by mouth 3 (Three) Times a Day As Needed for Anxiety. for anxiety   • pregabalin (LYRICA) 50 MG capsule 60 capsule 2     Sig: Take 1 capsule by mouth 2 (Two) Times a Day.        Pharmacy where request should be sent: [address] [phone] [facility] [phone] FX     Additional details provided by patient: ALSO REQUESTING QUETIAPINE 400 MG TABLET BUT WAS NOT ON THE MED LIST    Does the patient have less than a 3 day supply:  [x] Yes  [] No    Would you like a call back once the refill request has been completed: [] Yes [x] No    If the office needs to give you a call back, can they leave a voicemail: [] Yes [x] No    Kianna Olivas Rep   03/07/23 10:34 EST

## 2023-03-15 ENCOUNTER — TELEPHONE (OUTPATIENT)
Dept: FAMILY MEDICINE CLINIC | Facility: CLINIC | Age: 68
End: 2023-03-15
Payer: MEDICARE

## 2023-03-15 NOTE — TELEPHONE ENCOUNTER
DR YA PATIENT     [facility] called and wanted to let Dr. Ya know that they won't be doing services at this time.  They have attempted to contact pt and often can't get her. They report tat pt agreed for them to come out.  They went and then no answer at the door. She said that if Dr. Ya needs them in the future that they will need new orders.

## 2023-03-21 DIAGNOSIS — I10 PRIMARY HYPERTENSION: ICD-10-CM

## 2023-03-21 DIAGNOSIS — Z79.890 HORMONE REPLACEMENT THERAPY (HRT): ICD-10-CM

## 2023-03-21 DIAGNOSIS — F41.1 GAD (GENERALIZED ANXIETY DISORDER): ICD-10-CM

## 2023-03-21 DIAGNOSIS — E03.9 ACQUIRED HYPOTHYROIDISM: ICD-10-CM

## 2023-03-21 DIAGNOSIS — G89.4 CHRONIC PAIN SYNDROME: ICD-10-CM

## 2023-03-21 RX ORDER — ESTRADIOL 2 MG/1
TABLET ORAL
Qty: 90 TABLET | Refills: 1 | Status: SHIPPED | OUTPATIENT
Start: 2023-03-21

## 2023-03-21 RX ORDER — CARVEDILOL 25 MG/1
TABLET ORAL
Qty: 180 TABLET | Refills: 1 | Status: SHIPPED | OUTPATIENT
Start: 2023-03-21 | End: 2023-03-23

## 2023-03-21 RX ORDER — LEVOTHYROXINE SODIUM 0.05 MG/1
TABLET ORAL
Qty: 90 TABLET | Refills: 1 | Status: SHIPPED | OUTPATIENT
Start: 2023-03-21

## 2023-03-21 RX ORDER — HYDROXYZINE 50 MG/1
TABLET, FILM COATED ORAL
Qty: 120 TABLET | Refills: 1 | Status: SHIPPED | OUTPATIENT
Start: 2023-03-21 | End: 2023-03-23

## 2023-03-21 RX ORDER — TIZANIDINE 4 MG/1
TABLET ORAL
Qty: 120 TABLET | Refills: 1 | Status: SHIPPED | OUTPATIENT
Start: 2023-03-21

## 2023-03-23 ENCOUNTER — OFFICE VISIT (OUTPATIENT)
Dept: FAMILY MEDICINE CLINIC | Facility: CLINIC | Age: 68
End: 2023-03-23
Payer: MEDICARE

## 2023-03-23 VITALS
DIASTOLIC BLOOD PRESSURE: 92 MMHG | BODY MASS INDEX: 35.93 KG/M2 | WEIGHT: 190.3 LBS | SYSTOLIC BLOOD PRESSURE: 140 MMHG | HEIGHT: 61 IN | HEART RATE: 66 BPM | TEMPERATURE: 98.2 F | OXYGEN SATURATION: 96 %

## 2023-03-23 DIAGNOSIS — Z79.899 POLYPHARMACY: ICD-10-CM

## 2023-03-23 DIAGNOSIS — Z91.81 AT HIGH RISK FOR FALLS: ICD-10-CM

## 2023-03-23 DIAGNOSIS — N30.00 ACUTE CYSTITIS WITHOUT HEMATURIA: ICD-10-CM

## 2023-03-23 DIAGNOSIS — N32.81 OVERACTIVE BLADDER: ICD-10-CM

## 2023-03-23 DIAGNOSIS — R30.0 DYSURIA: ICD-10-CM

## 2023-03-23 DIAGNOSIS — R41.0 CONFUSION: Primary | ICD-10-CM

## 2023-03-23 DIAGNOSIS — F41.1 GAD (GENERALIZED ANXIETY DISORDER): ICD-10-CM

## 2023-03-23 PROBLEM — M79.10 MUSCLE PAIN: Status: ACTIVE | Noted: 2023-03-23

## 2023-03-23 PROBLEM — M54.50 LOW BACK PAIN: Status: ACTIVE | Noted: 2023-03-23

## 2023-03-23 PROBLEM — M25.561 PAIN IN RIGHT KNEE: Status: ACTIVE | Noted: 2023-03-23

## 2023-03-23 PROBLEM — M17.9 OSTEOARTHRITIS OF KNEE: Status: ACTIVE | Noted: 2023-03-23

## 2023-03-23 PROBLEM — M47.812 CERVICAL SPONDYLOSIS WITHOUT MYELOPATHY: Status: ACTIVE | Noted: 2023-03-23

## 2023-03-23 PROBLEM — M47.817 LUMBOSACRAL SPONDYLOSIS WITHOUT MYELOPATHY: Status: ACTIVE | Noted: 2023-03-23

## 2023-03-23 LAB
BILIRUB BLD-MCNC: NEGATIVE MG/DL
CLARITY, POC: ABNORMAL
COLOR UR: YELLOW
EXPIRATION DATE: ABNORMAL
GLUCOSE UR STRIP-MCNC: NEGATIVE MG/DL
KETONES UR QL: ABNORMAL
LEUKOCYTE EST, POC: ABNORMAL
Lab: ABNORMAL
NITRITE UR-MCNC: POSITIVE MG/ML
PH UR: 5.5 [PH] (ref 5–8)
PROT UR STRIP-MCNC: ABNORMAL MG/DL
RBC # UR STRIP: NEGATIVE /UL
SP GR UR: 1.02 (ref 1–1.03)
UROBILINOGEN UR QL: ABNORMAL

## 2023-03-23 RX ORDER — DIAZEPAM 2 MG/1
2 TABLET ORAL 3 TIMES DAILY
Qty: 90 TABLET | Refills: 0 | Status: SHIPPED | OUTPATIENT
Start: 2023-03-23

## 2023-03-23 RX ORDER — CEFDINIR 300 MG/1
300 CAPSULE ORAL 2 TIMES DAILY
Qty: 14 CAPSULE | Refills: 0 | Status: SHIPPED | OUTPATIENT
Start: 2023-03-23

## 2023-03-23 NOTE — PROGRESS NOTES
Date: 2023   Patient Name: Alina Tilley  : 1955   MRN: [id number]     Chief Complaint:    Chief Complaint   Patient presents with   • Extremity Weakness   • Altered Mental Status   • Insomnia       History of Present Illness: Alina Tilley is a 67 y.o. female who is here today to follow up for Chronic medical conditions.  Daughter is present with her today and has concerns regarding patient's weakness and behavior.  She is also concerned the patient is overmedicated.  Patient has been admitted to the hospital twice in the last 3 months and continues to have progressive physical debility and weakness.  We have set up home health with PT for her multiple times however she either does not answer the phone or her front door, or declines care.  She has had multiple falls in the home over the last month per her daughter.    The patient struggles with chronic insomnia and was previously on 400 mg of Seroquel at bedtime, diazepam 5 mg 1 at bedtime (although she has admittedly been taking 2 at bedtime), and hydroxyzine 50 mg.  She admits that she has not been taking all of her medications as prescribed and sometimes takes more of her bedtime medications to help with sleep.  She does wake frequently through the night and does not ever feel well rested.  She admits that she likely has sleep apnea but has never had a sleep study.    She also complains of worsening urinary incontinence specifically over the last week.  She is prescribed oxybutynin 10 mg daily however has been taking 2 at one time.  Today she complains of urinary odor and frequency.  She would like to discuss other medication options for treatment of incontinence.    Daughter reports the patient has been having hallucinations and patient admits to seeing and hearing things that are not present.  She is having progressive memory loss as well.           Review of Systems:   Review of Systems   Constitutional: Positive for fatigue. Negative for  chills and fever.   Respiratory: Negative for cough and shortness of breath.    Cardiovascular: Negative for chest pain and palpitations.   Gastrointestinal: Negative for abdominal pain, constipation, diarrhea, nausea and vomiting.   Genitourinary: Positive for frequency and urinary incontinence. Negative for dysuria and urgency.   Musculoskeletal: Positive for arthralgias, back pain, gait problem and myalgias.   Neurological: Positive for weakness, memory problem and confusion. Negative for dizziness and headache.   Psychiatric/Behavioral: Positive for sleep disturbance, depressed mood and stress. The patient is nervous/anxious.        Past Medical History:   Past Medical History:   Diagnosis Date   • Anxiety    • Arthritis    • Common migraine    • Congestive heart failure (CHF) (HCC)    • Esophageal reflux    • Hearing problem    • IBS (irritable bowel syndrome)    • Insomnia    • Neurotic depression    • Obesity    • Overactive bladder    • Thyroiditis    • Vision problems        Past Surgical History:   Past Surgical History:   Procedure Laterality Date   • APPENDECTOMY     • HERNIA REPAIR     • HYSTERECTOMY     • KIDNEY SURGERY     • LAPAROSCOPIC TUBAL LIGATION     • TONSILLECTOMY         Family History:   Family History   Problem Relation Age of Onset   • Osteoporosis Mother    • Osteoarthritis Mother    • Hypertension Father    • Mental illness Father    • Alcohol abuse Father    • Depression Father    • Hypertension Brother    • Mental illness Brother    • Alcohol abuse Brother    • Obesity Other    • Irritable bowel syndrome Other    • Mental illness Other    • Stroke Other    • Diabetes Other    • Cancer Other        Social History:   Social History     Socioeconomic History   • Marital status: Single   Tobacco Use   • Smoking status: Never   • Smokeless tobacco: Never   Vaping Use   • Vaping Use: Never used   Substance and Sexual Activity   • Alcohol use: Never   • Drug use: Never   • Sexual activity:  "Defer       Medications:     Current Outpatient Medications:   •  amLODIPine (NORVASC) 5 MG tablet, TAKE ONE TABLET BY MOUTH EVERY DAY, Disp: 90 tablet, Rfl: 1  •  busPIRone (BUSPAR) 30 MG tablet, TAKE ONE TABLET BY MOUTH EVERY TWELVE HOURS, Disp: 180 tablet, Rfl: 1  •  DULoxetine (CYMBALTA) 60 MG capsule, TAKE 2 CAPS BY MOUTH EVERY DAY, Disp: 180 capsule, Rfl: 1  •  estradiol (ESTRACE) 2 MG tablet, TAKE ONE TABLET BY MOUTH EVERY DAY, Disp: 90 tablet, Rfl: 1  •  ibuprofen (ADVIL,MOTRIN) 600 MG tablet, TAKE 1 TABLET BY MOUTH EVERY 8 HOURS AS NEEDED FOR MILD PAIN OR MODERATE PAIN., Disp: 90 tablet, Rfl: 1  •  levothyroxine (SYNTHROID, LEVOTHROID) 50 MCG tablet, TAKE ONE TABLET BY MOUTH EVERY DAY, Disp: 90 tablet, Rfl: 1  •  losartan (COZAAR) 50 MG tablet, TAKE ONE TABLET BY MOUTH DAILY, Disp: 90 tablet, Rfl: 1  •  pregabalin (LYRICA) 50 MG capsule, Take 1 capsule by mouth 2 (Two) Times a Day., Disp: 60 capsule, Rfl: 2  •  tiZANidine (ZANAFLEX) 4 MG tablet, TAKE ONE TABLET BY MOUTH EVERY 6 HOURS, Disp: 120 tablet, Rfl: 1  •  cefdinir (OMNICEF) 300 MG capsule, Take 1 capsule by mouth 2 (Two) Times a Day., Disp: 14 capsule, Rfl: 0  •  diazePAM (VALIUM) 2 MG tablet, Take 1 tablet by mouth 3 (Three) Times a Day., Disp: 90 tablet, Rfl: 0  •  nystatin (MYCOSTATIN) 100,000 unit/mL suspension, , Disp: , Rfl:     Allergies:   Allergies   Allergen Reactions   • Aripiprazole Unknown - Low Severity   • Gabapentin Unknown - High Severity   • Mirabegron Unknown - Low Severity   • Pregabalin Unknown - Low Severity   • Tramadol Unknown - Low Severity       PHQ-2 Total Score: 0   PHQ-9 Total Score: 0     Physical Exam:  Vital Signs:   Vitals:    03/23/23 1141   BP: 140/92   BP Location: Right arm   Patient Position: Sitting   Cuff Size: Adult   Pulse: 66   Temp: 98.2 °F (36.8 °C)   TempSrc: Temporal   SpO2: 96%   Weight: 86.3 kg (190 lb 4.8 oz)   Height: 154.9 cm (61\")     Body mass index is 35.96 kg/m².     Physical Exam  Vitals and " nursing note reviewed.   Constitutional:       Appearance: She is obese.      Comments: Chronically ill-appearing   HENT:      Head: Normocephalic and atraumatic.   Pulmonary:      Effort: Pulmonary effort is normal.   Musculoskeletal:      Comments: 3 out of 5 weakness of bilateral lower extremities, unsteady gait   Neurological:      Mental Status: She is alert. She is confused.      Motor: Weakness present.      Gait: Gait abnormal.   Psychiatric:         Attention and Perception: She perceives auditory and visual hallucinations.         Mood and Affect: Mood is depressed.      Comments: Patient often tries to change the conversation when talking about medication concerns and whenever she is questioned and feels uncomfortable           Assessment/Plan:   Diagnoses and all orders for this visit:    1. Confusion (Primary)  Assessment & Plan:  This is likely multifactorial to include polypharmacy, noncompliance with medication regimen, history of bipolar depression, and current UTI.  We are reducing sedating meds with potential side effect of confusion as above and will treat her UTI appropriately.  I will see her back in 3 weeks to reassess.    I spent 45 minutes with the patient and her daughter discussing concerns, obtaining appropriate history, reviewing recent hospitalization records, discussing medication changes, and on documentation.      2. Polypharmacy  Assessment & Plan:  She is certainly overmedicated but has refused changes in her medications multiple times in the past.  With daughter's valid concerns, we have chosen to decrease her diazepam from 5 mg 3 times daily to 2 mg 3 times daily.  She was also instructed to discontinue hydroxyzine and Seroquel.  We will reassess in 3 weeks.      3. Overactive bladder  Assessment & Plan:  We decided to change oxybutynin to Myrbetriq.  She was given samples of the 25 mg tablets.  She was instructed to take these as prescribed and not to increase the dose.  UTI is  also making symptoms acutely worse.      4. Acute cystitis without hematuria  Assessment & Plan:  Rx cefdinir, UA sent for culture    Orders:  -     cefdinir (OMNICEF) 300 MG capsule; Take 1 capsule by mouth 2 (Two) Times a Day.  Dispense: 14 capsule; Refill: 0  -     Urine Culture - Urine, Urine, Clean Catch; Future  -     POCT urinalysis dipstick, automated  -     Urine Culture - Urine, Urine, Clean Catch    5. BARBARA (generalized anxiety disorder)  Assessment & Plan:  Psychological condition is unchanged.  We will decrease diazepam to 2 mg 3 times daily.  I emphasized the importance of taking the medication as prescribed and not increasing the dose at bedtime.  She was warned that should she continue not taking the medication appropriately we will discontinue her controlled medications.  Psychological condition  will be reassessed at the next regular appointment.    Orders:  -     diazePAM (VALIUM) 2 MG tablet; Take 1 tablet by mouth 3 (Three) Times a Day.  Dispense: 90 tablet; Refill: 0    6. Dysuria  -     Urine Culture - Urine, Urine, Clean Catch; Future  -     POCT urinalysis dipstick, automated  -     Urine Culture - Urine, Urine, Clean Catch    7. At high risk for falls  Assessment & Plan:  Fall risk information posted to after visit summary.  I am hoping that this improves with in-home PT and medication changes as above           Follow Up:   Return in about 1 month (around 4/23/2023) for Med Recheck.    Avis Mejias DO  [facility]

## 2023-03-23 NOTE — PATIENT INSTRUCTIONS
Fall Prevention in the Home, Adult  Falls can cause injuries and affect people of all ages. There are many simple things that you can do to make your home safe and to help prevent falls. Ask for help when making these changes, if needed.  What actions can I take to prevent falls?  General instructions  • Use good lighting in all rooms. Replace any light bulbs that burn out, turn on lights if it is dark, and use night-lights.  • Place frequently used items in easy-to-reach places. Lower the shelves around your home if necessary.  • Set up furniture so that there are clear paths around it. Avoid moving your furniture around.  • Remove throw rugs and other tripping hazards from the floor.  • Avoid walking on wet floors.  • Fix any uneven floor surfaces.  • Add color or contrast paint or tape to grab bars and handrails in your home. Place contrasting color strips on the first and last steps of staircases.  • When you use a stepladder, make sure that it is completely opened and that the sides and supports are firmly locked. Have someone hold the ladder while you are using it. Do not climb a closed stepladder.  • Know where your pets are when moving through your home.  What can I do in the bathroom?     • Keep the floor dry. Immediately clean up any water that is on the floor.  • Remove soap buildup in the tub or shower regularly.  • Use nonskid mats or decals on the floor of the tub or shower.  • Attach bath mats securely with double-sided, nonslip rug tape.  • If you need to sit down while you are in the shower, use a plastic, nonslip stool.  • Install grab bars by the toilet and in the tub and shower. Do not use towel bars as grab bars.  What can I do in the bedroom?  • Make sure that a bedside light is easy to reach.  • Do not use oversized bedding that reaches the floor.  • Have a firm chair that has side arms to use for getting dressed.  What can I do in the kitchen?  • Clean up any spills right away.  • If you  need to reach for something above you, use a sturdy step stool that has a grab bar.  • Keep electrical cables out of the way.  • Do not use floor polish or wax that makes floors slippery. If you must use wax, make sure that it is non-skid floor wax.  What can I do with my stairs?  • Do not leave any items on the stairs.  • Make sure that you have a light switch at the top and the bottom of the stairs. Have them installed if you do not have them.  • Make sure that there are handrails on both sides of the stairs. Fix handrails that are broken or loose. Make sure that handrails are as long as the staircases.  • Install non-slip stair treads on all stairs in your home.  • Avoid having throw rugs at the top or bottom of stairs, or secure the rugs with carpet tape to prevent them from moving.  • Choose a carpet design that does not hide the edge of steps on the stairs.  • Check any carpeting to make sure that it is firmly attached to the stairs. Fix any carpet that is loose or worn.  What can I do on the outside of my home?  • Use bright outdoor lighting.  • Regularly repair the edges of walkways and driveways and fix any cracks.  • Remove high doorway thresholds.  • Trim any shrubbery on the main path into your home.  • Regularly check that handrails are securely fastened and in good repair. Both sides of all steps should have handrails.  • Install guardrails along the edges of any raised decks or porches.  • Clear walkways of debris and clutter, including tools and rocks.  • Have leaves, snow, and ice cleared regularly.  • Use sand or salt on walkways during winter months.  • In the garage, clean up any spills right away, including grease or oil spills.  What other actions can I take?  • Wear closed-toe shoes that fit well and support your feet. Wear shoes that have rubber soles or low heels.  • Use mobility aids as needed, such as canes, walkers, scooters, and crutches.  • Review your medicines with your health care  provider. Some medicines can cause dizziness or changes in blood pressure, which increase your risk of falling.  Talk with your health care provider about other ways that you can decrease your risk of falls. This may include working with a physical therapist or  to improve your strength, balance, and endurance.  Where to find more information  • Centers for Disease Control and Prevention, [place]: www.cdc.gov  • [place] [employer] [place]: www.mario.nih.gov  Contact a health care provider if:  • You are afraid of falling at home.  • You feel weak, drowsy, or dizzy at home.  • You fall at home.  Summary  • There are many simple things that you can do to make your home safe and to help prevent falls.  • Ways to make your home safe include removing tripping hazards and installing grab bars in the bathroom.  • Ask for help when making these changes in your home.  This information is not intended to replace advice given to you by your health care provider. Make sure you discuss any questions you have with your health care provider.  Document Revised: 09/19/2022 Document Reviewed: 07/21/2021  Elsevier Patient Education © 2022 Elsevier Inc.

## 2023-03-24 ENCOUNTER — TELEPHONE (OUTPATIENT)
Dept: FAMILY MEDICINE CLINIC | Facility: CLINIC | Age: 68
End: 2023-03-24

## 2023-03-24 NOTE — ASSESSMENT & PLAN NOTE
Fall risk information posted to after visit summary.  I am hoping that this improves with in-home PT and medication changes as above

## 2023-03-24 NOTE — TELEPHONE ENCOUNTER
Caller: JarekAlina    Relationship: Self    Best call back number: [phone]    What medications are you currently taking:   Current Outpatient Medications on File Prior to Visit   Medication Sig Dispense Refill   • amLODIPine (NORVASC) 5 MG tablet TAKE ONE TABLET BY MOUTH EVERY DAY 90 tablet 1   • busPIRone (BUSPAR) 30 MG tablet TAKE ONE TABLET BY MOUTH EVERY TWELVE HOURS 180 tablet 1   • cefdinir (OMNICEF) 300 MG capsule Take 1 capsule by mouth 2 (Two) Times a Day. 14 capsule 0   • diazePAM (VALIUM) 2 MG tablet Take 1 tablet by mouth 3 (Three) Times a Day. 90 tablet 0   • DULoxetine (CYMBALTA) 60 MG capsule TAKE 2 CAPS BY MOUTH EVERY  capsule 1   • estradiol (ESTRACE) 2 MG tablet TAKE ONE TABLET BY MOUTH EVERY DAY 90 tablet 1   • ibuprofen (ADVIL,MOTRIN) 600 MG tablet TAKE 1 TABLET BY MOUTH EVERY 8 HOURS AS NEEDED FOR MILD PAIN OR MODERATE PAIN. 90 tablet 1   • levothyroxine (SYNTHROID, LEVOTHROID) 50 MCG tablet TAKE ONE TABLET BY MOUTH EVERY DAY 90 tablet 1   • losartan (COZAAR) 50 MG tablet TAKE ONE TABLET BY MOUTH DAILY 90 tablet 1   • nystatin (MYCOSTATIN) 100,000 unit/mL suspension      • pregabalin (LYRICA) 50 MG capsule Take 1 capsule by mouth 2 (Two) Times a Day. 60 capsule 2   • tiZANidine (ZANAFLEX) 4 MG tablet TAKE ONE TABLET BY MOUTH EVERY 6 HOURS 120 tablet 1     No current facility-administered medications on file prior to visit.          When did you start taking these medications: 3.23    Which medication are you concerned about: MYRBETRIQ     Who prescribed you this medication: DR YA    What are your concerns: WAS TAKEN OFF OF CARVEDILOL AND GIVEN MYRBETRIQ INSTEAD, PATIENT IS CONCERNED WITH SIDE EFFECTS SINCE IT SEEMS IT COULD MAKE WORSE HER HIGH BLOOD PRESSURE AND KIDNEYS. WANTS TO STAY ON CARVEDILOL IF POSSIBLE. WOULD LIKE TO DISCUSS WITH DR SAUL OR HER NURSE AS SOON AS POSSIBLE     How long have you had these concerns: 3.23.23        DELETE AFTER READING TO  PATIENT: “Thank you for sharing this information with me. I will send a message to the clinical team. Please allow 48 hours for the clinical staff to follow up on this request.  If your symptoms worsen, please seek emergent medical attention.”

## 2023-03-24 NOTE — ASSESSMENT & PLAN NOTE
This is likely multifactorial to include polypharmacy, noncompliance with medication regimen, history of bipolar depression, and current UTI.  We are reducing sedating meds with potential side effect of confusion as above and will treat her UTI appropriately.  I will see her back in 3 weeks to reassess.    I spent 45 minutes with the patient and her daughter discussing concerns, obtaining appropriate history, reviewing recent hospitalization records, discussing medication changes, and on documentation.

## 2023-03-24 NOTE — ASSESSMENT & PLAN NOTE
We decided to change oxybutynin to Myrbetriq.  She was given samples of the 25 mg tablets.  She was instructed to take these as prescribed and not to increase the dose.  UTI is also making symptoms acutely worse.

## 2023-03-24 NOTE — ASSESSMENT & PLAN NOTE
She is certainly overmedicated but has refused changes in her medications multiple times in the past.  With daughter's valid concerns, we have chosen to decrease her diazepam from 5 mg 3 times daily to 2 mg 3 times daily.  She was also instructed to discontinue hydroxyzine and Seroquel.  We will reassess in 3 weeks.

## 2023-03-24 NOTE — ASSESSMENT & PLAN NOTE
Psychological condition is unchanged.  We will decrease diazepam to 2 mg 3 times daily.  I emphasized the importance of taking the medication as prescribed and not increasing the dose at bedtime.  She was warned that should she continue not taking the medication appropriately we will discontinue her controlled medications.  Psychological condition  will be reassessed at the next regular appointment.

## 2023-03-28 LAB
BACTERIA UR CULT: ABNORMAL
BACTERIA UR CULT: ABNORMAL
OTHER ANTIBIOTIC SUSC ISLT: ABNORMAL

## 2023-04-05 ENCOUNTER — TELEPHONE (OUTPATIENT)
Dept: FAMILY MEDICINE CLINIC | Facility: CLINIC | Age: 68
End: 2023-04-05

## 2023-04-05 NOTE — TELEPHONE ENCOUNTER
Caller: Alina Tilley    Relationship: Self    Best call back number:  [phone]     Who are you requesting to speak with (clinical staff, provider,  specific staff member):  CLINICAL     What was the call regarding: PATIENT SAID SHE HAD BEEN TAKING  THE MYRBETRIQ FOR 2 WEEKS AND SHE IS GOING TO URINATE EVERY 4 TO 5 MINUTES     PLEASE ADVISE      [facility]

## 2023-04-06 RX ORDER — OXYBUTYNIN CHLORIDE 15 MG/1
15 TABLET, EXTENDED RELEASE ORAL DAILY
Qty: 30 TABLET | Refills: 5 | Status: SHIPPED | OUTPATIENT
Start: 2023-04-06

## 2023-04-10 ENCOUNTER — TELEPHONE (OUTPATIENT)
Dept: FAMILY MEDICINE CLINIC | Facility: CLINIC | Age: 68
End: 2023-04-10
Payer: MEDICARE

## 2023-04-10 NOTE — TELEPHONE ENCOUNTER
DR YA PATIENT   [facility] called because pt was wanting to get a script replaced. They said she reports either losing or throwing away her Diazepam.

## 2023-04-14 ENCOUNTER — TELEPHONE (OUTPATIENT)
Dept: FAMILY MEDICINE CLINIC | Facility: CLINIC | Age: 68
End: 2023-04-14

## 2023-04-14 NOTE — TELEPHONE ENCOUNTER
Caller: TOÑITO NAVAS    Relationship to patient: Emergency Contact    Best call back number: [phone]    Patient is needing: PATIENTS DAUGHTER TOÑITO WAS RETURNING A CALL. PLEASE ADVISE

## 2023-05-02 DIAGNOSIS — G89.4 CHRONIC PAIN SYNDROME: ICD-10-CM

## 2023-05-03 RX ORDER — IBUPROFEN 600 MG/1
TABLET ORAL
Qty: 90 TABLET | Refills: 1 | Status: SHIPPED | OUTPATIENT
Start: 2023-05-03

## 2023-05-11 ENCOUNTER — TELEPHONE (OUTPATIENT)
Dept: FAMILY MEDICINE CLINIC | Facility: CLINIC | Age: 68
End: 2023-05-11

## 2023-05-11 DIAGNOSIS — F41.1 GAD (GENERALIZED ANXIETY DISORDER): ICD-10-CM

## 2023-05-11 NOTE — TELEPHONE ENCOUNTER
Caller: Alina Tilley    Relationship: Self    Best call back number: [phone]    Requested Prescriptions:   Requested Prescriptions     Pending Prescriptions Disp Refills   • diazePAM (VALIUM) 2 MG tablet 90 tablet 0     Sig: Take 1 tablet by mouth 3 (Three) Times a Day.   • oxybutynin XL (DITROPAN XL) 15 MG 24 hr tablet 30 tablet 5     Sig: Take 1 tablet by mouth Daily.      THE PATIENT IS ALSO REQUESTING AN OINTMENT FOR TOOTH AND GUM PAIN.     Pharmacy where request should be sent: [address] [phone] [facility] [phone] FX     Last office visit with prescribing clinician: 3/23/2023   Last telemedicine visit with prescribing clinician: 5/2/2023   Next office visit with prescribing clinician: Visit date not found     Additional details provided by patient: THE PATIENT STATES THAT THE LOWER DOSES OF MEDICATION DO NOT HELP HER.    Does the patient have less than a 3 day supply:  [x] Yes  [] No     Would you like a call back once the refill request has been completed: [] Yes [x] No    If the office needs to give you a call back, can they leave a voicemail: [] Yes [x] No    Kianna Lemon Rep   05/11/23 10:35 EDT     THANK YOU.

## 2023-05-11 NOTE — TELEPHONE ENCOUNTER
Caller: Alina Tilley    Relationship: Self    Best call back number: [phone]    What orders are you requesting (i.e. lab or imaging): HOME HEALTH TO DO BLOOD PRESSURE CHECKS    Additional notes: PLEASE CALL TO ADVISE.     THANK YOU.

## 2023-05-12 NOTE — TELEPHONE ENCOUNTER
I called patient and she does not have any one coming at this time. She does not have a preference on who she uses.

## 2023-05-15 RX ORDER — DIAZEPAM 2 MG/1
2 TABLET ORAL 2 TIMES DAILY PRN
Qty: 60 TABLET | Refills: 0 | Status: SHIPPED | OUTPATIENT
Start: 2023-05-15

## 2023-05-15 RX ORDER — OXYBUTYNIN CHLORIDE 15 MG/1
15 TABLET, EXTENDED RELEASE ORAL DAILY
Qty: 30 TABLET | Refills: 5 | Status: SHIPPED | OUTPATIENT
Start: 2023-05-15

## 2023-05-16 ENCOUNTER — TELEPHONE (OUTPATIENT)
Dept: FAMILY MEDICINE CLINIC | Facility: CLINIC | Age: 68
End: 2023-05-16
Payer: MEDICARE

## 2023-05-16 RX ORDER — NITROFURANTOIN 25; 75 MG/1; MG/1
100 CAPSULE ORAL 2 TIMES DAILY
Qty: 10 CAPSULE | Refills: 0 | Status: SHIPPED | OUTPATIENT
Start: 2023-05-16

## 2023-05-16 NOTE — TELEPHONE ENCOUNTER
Patient thinks she has a urinary tract infection, she is requesting something to be sent in. Please advise.

## 2023-05-17 DIAGNOSIS — G89.4 CHRONIC PAIN SYNDROME: ICD-10-CM

## 2023-05-17 RX ORDER — IBUPROFEN 600 MG/1
600 TABLET ORAL EVERY 8 HOURS PRN
Qty: 90 TABLET | Refills: 1 | Status: SHIPPED | OUTPATIENT
Start: 2023-05-17 | End: 2023-05-19 | Stop reason: SDUPTHER

## 2023-05-17 NOTE — TELEPHONE ENCOUNTER
Caller: JarekAlina    Relationship: Self    Best call back number: [phone]    Requested Prescriptions:   Requested Prescriptions     Pending Prescriptions Disp Refills   • ibuprofen (ADVIL,MOTRIN) 600 MG tablet 90 tablet 1     Sig: Take 1 tablet by mouth Every 8 (Eight) Hours As Needed for Mild Pain or Moderate Pain.        Pharmacy where request should be sent: [address] [phone] [facility] [phone] FX     Last office visit with prescribing clinician: 3/23/2023   Last telemedicine visit with prescribing clinician: 5/16/2023   Next office visit with prescribing clinician: Visit date not found     Additional details provided by patient: PATIENT HAS BEEN OUT FOR SEVERAL DAYS, PLEASE ADVISE     Does the patient have less than a 3 day supply:  [x] Yes  [] No    Would you like a call back once the refill request has been completed: [x] Yes [] No    If the office needs to give you a call back, can they leave a voicemail: [x] Yes [] No    Kianna Cummings Rep   05/17/23 08:52 EDT

## 2023-05-19 DIAGNOSIS — G89.4 CHRONIC PAIN SYNDROME: ICD-10-CM

## 2023-05-19 DIAGNOSIS — E03.9 ACQUIRED HYPOTHYROIDISM: ICD-10-CM

## 2023-05-19 DIAGNOSIS — I10 PRIMARY HYPERTENSION: ICD-10-CM

## 2023-05-19 DIAGNOSIS — Z79.890 HORMONE REPLACEMENT THERAPY (HRT): ICD-10-CM

## 2023-05-19 NOTE — TELEPHONE ENCOUNTER
Caller: Alina Tilley    Relationship: Self    Best call back number: [phone]    Requested Prescriptions:   Requested Prescriptions     Pending Prescriptions Disp Refills   • tiZANidine (ZANAFLEX) 4 MG tablet 120 tablet 1     Sig: Take 1 tablet by mouth Every 6 (Six) Hours.   • levothyroxine (SYNTHROID, LEVOTHROID) 50 MCG tablet 90 tablet 1     Sig: Take 1 tablet by mouth Daily.   • estradiol (ESTRACE) 2 MG tablet 90 tablet 1     Sig: Take 1 tablet by mouth Daily.   • amLODIPine (NORVASC) 5 MG tablet 90 tablet 1     Sig: Take 1 tablet by mouth Daily.   • ibuprofen (ADVIL,MOTRIN) 600 MG tablet 90 tablet 1     Sig: Take 1 tablet by mouth Every 8 (Eight) Hours As Needed for Mild Pain or Moderate Pain.        Pharmacy where request should be sent: [address] [phone] [facility] [phone] FX     Last office visit with prescribing clinician: 3/23/2023   Last telemedicine visit with prescribing clinician: 5/17/2023   Next office visit with prescribing clinician: Visit date not found     Additional details provided by patient: OUT OF MEDICATION     Does the patient have less than a 3 day supply:  [x] Yes  [] No    Would you like a call back once the refill request has been completed: [] Yes [x] No    If the office needs to give you a call back, can they leave a voicemail: [] Yes [x] No    Kianna Calderon   05/19/23 11:07 EDT

## 2023-05-22 RX ORDER — ESTRADIOL 2 MG/1
2 TABLET ORAL DAILY
Qty: 90 TABLET | Refills: 1 | Status: SHIPPED | OUTPATIENT
Start: 2023-05-22

## 2023-05-22 RX ORDER — TIZANIDINE 4 MG/1
4 TABLET ORAL EVERY 6 HOURS
Qty: 120 TABLET | Refills: 1 | Status: SHIPPED | OUTPATIENT
Start: 2023-05-22

## 2023-05-22 RX ORDER — AMLODIPINE BESYLATE 5 MG/1
5 TABLET ORAL DAILY
Qty: 90 TABLET | Refills: 1 | Status: SHIPPED | OUTPATIENT
Start: 2023-05-22

## 2023-05-22 RX ORDER — LEVOTHYROXINE SODIUM 0.05 MG/1
50 TABLET ORAL DAILY
Qty: 90 TABLET | Refills: 1 | Status: SHIPPED | OUTPATIENT
Start: 2023-05-22

## 2023-05-22 RX ORDER — IBUPROFEN 600 MG/1
600 TABLET ORAL EVERY 8 HOURS PRN
Qty: 90 TABLET | Refills: 1 | Status: SHIPPED | OUTPATIENT
Start: 2023-05-22

## 2023-06-13 DIAGNOSIS — I10 PRIMARY HYPERTENSION: ICD-10-CM

## 2023-06-13 DIAGNOSIS — E03.9 ACQUIRED HYPOTHYROIDISM: ICD-10-CM

## 2023-06-13 DIAGNOSIS — G89.4 CHRONIC PAIN SYNDROME: ICD-10-CM

## 2023-06-13 RX ORDER — LEVOTHYROXINE SODIUM 0.05 MG/1
50 TABLET ORAL DAILY
Qty: 90 TABLET | Refills: 1 | Status: SHIPPED | OUTPATIENT
Start: 2023-06-13

## 2023-06-13 RX ORDER — AMLODIPINE BESYLATE 5 MG/1
5 TABLET ORAL DAILY
Qty: 90 TABLET | Refills: 1 | Status: SHIPPED | OUTPATIENT
Start: 2023-06-13

## 2023-06-13 RX ORDER — AMLODIPINE BESYLATE 5 MG/1
5 TABLET ORAL DAILY
Qty: 90 TABLET | Refills: 1 | Status: CANCELLED | OUTPATIENT
Start: 2023-06-13

## 2023-06-13 RX ORDER — PREGABALIN 50 MG/1
50 CAPSULE ORAL 2 TIMES DAILY
Qty: 60 CAPSULE | Refills: 2 | Status: CANCELLED | OUTPATIENT
Start: 2023-06-13

## 2023-06-13 RX ORDER — OXYBUTYNIN CHLORIDE 15 MG/1
15 TABLET, EXTENDED RELEASE ORAL DAILY
Qty: 30 TABLET | Refills: 5 | Status: CANCELLED | OUTPATIENT
Start: 2023-06-13

## 2023-06-13 RX ORDER — OXYBUTYNIN CHLORIDE 15 MG/1
15 TABLET, EXTENDED RELEASE ORAL DAILY
Qty: 30 TABLET | Refills: 5 | Status: SHIPPED | OUTPATIENT
Start: 2023-06-13

## 2023-06-13 RX ORDER — LEVOTHYROXINE SODIUM 0.05 MG/1
50 TABLET ORAL DAILY
Qty: 90 TABLET | Refills: 1 | Status: CANCELLED | OUTPATIENT
Start: 2023-06-13

## 2023-06-13 NOTE — TELEPHONE ENCOUNTER
Caller: Alina Tilley    Relationship: Self    Best call back number: [phone]     Requested Prescriptions:   Requested Prescriptions     Pending Prescriptions Disp Refills    levothyroxine (SYNTHROID, LEVOTHROID) 50 MCG tablet 90 tablet 1     Sig: Take 1 tablet by mouth Daily.    pregabalin (LYRICA) 50 MG capsule 60 capsule 2     Sig: Take 1 capsule by mouth 2 (Two) Times a Day.    oxybutynin XL (DITROPAN XL) 15 MG 24 hr tablet 30 tablet 5     Sig: Take 1 tablet by mouth Daily.    amLODIPine (NORVASC) 5 MG tablet 90 tablet 1     Sig: Take 1 tablet by mouth Daily.        Pharmacy where request should be sent: [address] [phone] [facility] [phone] FX     Last office visit with prescribing clinician: 3/23/2023   Last telemedicine visit with prescribing clinician: Visit date not found   Next office visit with prescribing clinician: Visit date not found     Additional details provided by patient: OXYBUTYNIN IS NOT WORKING OR NOT STRONG ENOUGH.  PLEASE CALL.  OUT OF SOME MEDICATION.     Does the patient have less than a 3 day supply:  [x] Yes  [] No    Would you like a call back once the refill request has been completed: [x] Yes [] No    If the office needs to give you a call back, can they leave a voicemail: [x] Yes [] No    Abi Valdes   06/13/23 09:21 EDT

## 2023-07-26 DIAGNOSIS — F41.1 GAD (GENERALIZED ANXIETY DISORDER): ICD-10-CM

## 2023-07-26 RX ORDER — DIAZEPAM 2 MG/1
TABLET ORAL
Qty: 90 TABLET | Refills: 0 | OUTPATIENT
Start: 2023-07-26

## 2023-07-26 RX ORDER — HYDROXYZINE 50 MG/1
TABLET, FILM COATED ORAL
Qty: 120 TABLET | Refills: 1 | OUTPATIENT
Start: 2023-07-26

## 2023-07-26 NOTE — TELEPHONE ENCOUNTER
Caller: Alina Tilley    Relationship: Self    Best call back number: [phone]    Requested Prescriptions:   Requested Prescriptions     Pending Prescriptions Disp Refills    hydrOXYzine (ATARAX) 50 MG tablet [Pharmacy Med Name: HYDROXYZINE HCL 50 MG TABS 50 Tablet] 120 tablet 1     Sig: TAKE ONE TABLET BY MOUTH EVERY 6 HOURS    diazePAM (VALIUM) 2 MG tablet [Pharmacy Med Name: DIAZEPAM 2 MG TABLET 2 Tablet] 90 tablet 0     Sig: TAKE ONE TABLET BY MOUTH THREE TIMES A DAY        Pharmacy where request should be sent: [address] [phone] [facility] [phone] FX     Last office visit with prescribing clinician: 3/23/2023   Last telemedicine visit with prescribing clinician: Visit date not found   Next office visit with prescribing clinician: Visit date not found     Additional details provided by patient: PATIENT OUT OF MEDICATION.  PATIENT STATED THAT ANTI-BIOTICS HELPED GUMS GO DOWN.    Does the patient have less than a 3 day supply:  [x] Yes  [] No    Kianna Chen Rep   07/26/23 10:46 EDT

## 2023-08-17 ENCOUNTER — OFFICE VISIT (OUTPATIENT)
Dept: FAMILY MEDICINE CLINIC | Facility: CLINIC | Age: 68
End: 2023-08-17
Payer: MEDICARE

## 2023-08-17 VITALS
TEMPERATURE: 98.4 F | WEIGHT: 179.8 LBS | HEIGHT: 61 IN | HEART RATE: 80 BPM | OXYGEN SATURATION: 97 % | BODY MASS INDEX: 33.95 KG/M2

## 2023-08-17 DIAGNOSIS — G89.4 CHRONIC PAIN SYNDROME: ICD-10-CM

## 2023-08-17 DIAGNOSIS — E03.9 ACQUIRED HYPOTHYROIDISM: ICD-10-CM

## 2023-08-17 DIAGNOSIS — F41.1 GAD (GENERALIZED ANXIETY DISORDER): ICD-10-CM

## 2023-08-17 DIAGNOSIS — K21.9 GASTROESOPHAGEAL REFLUX DISEASE, UNSPECIFIED WHETHER ESOPHAGITIS PRESENT: ICD-10-CM

## 2023-08-17 DIAGNOSIS — I10 PRIMARY HYPERTENSION: ICD-10-CM

## 2023-08-17 DIAGNOSIS — N30.00 ACUTE CYSTITIS WITHOUT HEMATURIA: Primary | ICD-10-CM

## 2023-08-17 DIAGNOSIS — N32.81 OAB (OVERACTIVE BLADDER): ICD-10-CM

## 2023-08-17 LAB
BILIRUB BLD-MCNC: NEGATIVE MG/DL
CLARITY, POC: CLEAR
COLOR UR: YELLOW
EXPIRATION DATE: ABNORMAL
GLUCOSE UR STRIP-MCNC: NEGATIVE MG/DL
KETONES UR QL: NEGATIVE
LEUKOCYTE EST, POC: ABNORMAL
Lab: ABNORMAL
NITRITE UR-MCNC: POSITIVE MG/ML
PH UR: 6 [PH] (ref 5–8)
PROT UR STRIP-MCNC: ABNORMAL MG/DL
RBC # UR STRIP: ABNORMAL /UL
SP GR UR: 1.02 (ref 1–1.03)
UROBILINOGEN UR QL: ABNORMAL

## 2023-08-17 RX ORDER — PREGABALIN 50 MG/1
50 CAPSULE ORAL 2 TIMES DAILY
Qty: 60 CAPSULE | Refills: 2 | Status: SHIPPED | OUTPATIENT
Start: 2023-08-17 | End: 2023-08-19 | Stop reason: SDUPTHER

## 2023-08-17 RX ORDER — OXYBUTYNIN CHLORIDE 15 MG/1
15 TABLET, EXTENDED RELEASE ORAL DAILY
Qty: 30 TABLET | Refills: 5 | Status: CANCELLED | OUTPATIENT
Start: 2023-08-17

## 2023-08-17 RX ORDER — ESOMEPRAZOLE MAGNESIUM 40 MG/1
40 CAPSULE, DELAYED RELEASE ORAL
Qty: 90 CAPSULE | Refills: 0 | Status: SHIPPED | OUTPATIENT
Start: 2023-08-17

## 2023-08-17 RX ORDER — IBUPROFEN 600 MG/1
600 TABLET ORAL EVERY 8 HOURS PRN
Qty: 90 TABLET | Refills: 1 | Status: SHIPPED | OUTPATIENT
Start: 2023-08-17

## 2023-08-17 RX ORDER — CEPHALEXIN 500 MG/1
500 CAPSULE ORAL 3 TIMES DAILY
Qty: 30 CAPSULE | Refills: 0 | Status: SHIPPED | OUTPATIENT
Start: 2023-08-17

## 2023-08-17 RX ORDER — DIAZEPAM 2 MG/1
2 TABLET ORAL 2 TIMES DAILY PRN
Qty: 60 TABLET | Refills: 2 | Status: SHIPPED | OUTPATIENT
Start: 2023-08-17 | End: 2023-08-19 | Stop reason: SDUPTHER

## 2023-08-17 NOTE — ASSESSMENT & PLAN NOTE
I recommend increased fluids and completing full course of antibiotics.  Urine will be sent for a culture to confirm antibiotic efficacy.

## 2023-08-17 NOTE — ASSESSMENT & PLAN NOTE
We will resume the esomeprazole to see if her symptoms resolve.  If they do not, then I would recommend seeing GI for potential scope.  She and her daughter are in agreement.

## 2023-08-17 NOTE — ASSESSMENT & PLAN NOTE
We will resume the pregabalin since she was tolerating it well, and she has not been able to go any lower on her dose without decrease in daily function.

## 2023-08-17 NOTE — ASSESSMENT & PLAN NOTE
He has not done as well with the oxybutynin, so we can try to switch it to Myrbetriq for more improved efficacy.  I advised her to monitor her symptoms.

## 2023-08-17 NOTE — ASSESSMENT & PLAN NOTE
She was functioning better and had more motivation on the low-dose of medication than she did on either the higher dose or without the medication, so we will refill the diazepam at twice daily as needed.    CANDIS reviewed and compliant.  Request #[id number]

## 2023-08-17 NOTE — PROGRESS NOTES
Office Note     Name: Alina Tilley    : 1955     MRN: [id number]     Chief Complaint  Hospital Follow Up Visit    Subjective     History of Present Illness:  Alina Tilley is a 68 y.o. female companied by her daughter who presents today for eval of frequent urination and irritation.  She states that she has been having recurrent UTIs, but she has had overactive bladder for a long time.  She states that she thinks the oxybutynin is helping some, but it is not helping enough.  She states that she has been on it for over a year now, and it has not gotten much better.  She states that she also had a UTI couple of months ago and went to the ER, and she took the complete course of antibiotics.    She has also been out of her pregabalin and diazepam for at least 2 to 3 weeks now.  She states that she has been having a very difficult time since she has been out of the medications.  She states that it has been hard to get motivated to do anything, and she has not been as active.  Her daughter states that she was having some confusion and lethargy on the higher doses of those medications, but she had been functioning much better while she was taking the adjusted doses.  She states that she had been cleaning her house, doing laundry, and being a little bit more social with the most recent doses of diazepam and pregabalin.  Since she has run out of him, her daughter has seen a decline in her motivation and cognition.  They would both like to resume the medications.    Ms. Tilley states that she has been having more heartburn, and her daughter states that she noticed she has been requesting Mylanta from the grocery store just about every time she goes.  She states that the Mylanta does help her symptoms, but it is only temporary.  She states that she has had some more nausea and decreased appetite.  She has been off of her Nexium for some time now, and she states that she is not sure why she stopped it.  She thinks  that she just did not need it anymore.  However, her symptoms have returned since she has stopped the medication.    Past Medical History:   Past Medical History:   Diagnosis Date    Anxiety     Arthritis     Common migraine     Congestive heart failure (CHF)     Esophageal reflux     Hearing problem     IBS (irritable bowel syndrome)     Insomnia     Neurotic depression     Obesity     Overactive bladder     Thyroiditis     Vision problems        Past Surgical History:   Past Surgical History:   Procedure Laterality Date    APPENDECTOMY      HERNIA REPAIR      HYSTERECTOMY      KIDNEY SURGERY      LAPAROSCOPIC TUBAL LIGATION      TONSILLECTOMY         Family History:   Family History   Problem Relation Age of Onset    Osteoporosis Mother     Osteoarthritis Mother     Hypertension Father     Mental illness Father     Alcohol abuse Father     Depression Father     Hypertension Brother     Mental illness Brother     Alcohol abuse Brother     Obesity Other     Irritable bowel syndrome Other     Mental illness Other     Stroke Other     Diabetes Other     Cancer Other        Social History:   Social History     Socioeconomic History    Marital status: Single   Tobacco Use    Smoking status: Never    Smokeless tobacco: Never   Vaping Use    Vaping Use: Never used   Substance and Sexual Activity    Alcohol use: Never    Drug use: Never    Sexual activity: Defer       Immunizations:   Immunization History   Administered Date(s) Administered    COVID-19 (MODERNA) 1st,2nd,3rd Dose Monovalent 10/03/2021, 12/03/2021    Flu Vaccine Quad PF >36MO 10/06/2015, 10/09/2017    Flublok 18+yrs 11/06/2019    Fluzone High Dose =>65 Years ([employer] ONLY) 11/01/2019    Fluzone High-Dose 65+yrs 10/03/2021    Fluzone Quad >6mos (Multi-dose) 11/06/2019    Influenza, Unspecified 11/06/2019    Pneumococcal Conjugate 13-Valent (PCV13) 03/19/2020    Pneumococcal Polysaccharide (PPSV23) 09/11/2014, 10/09/2017, 10/03/2021    Tdap 06/07/2017         Medications:     Current Outpatient Medications:     amLODIPine (NORVASC) 5 MG tablet, Take 1 tablet by mouth Daily., Disp: 90 tablet, Rfl: 1    busPIRone (BUSPAR) 30 MG tablet, TAKE ONE TABLET BY MOUTH EVERY TWELVE HOURS, Disp: 180 tablet, Rfl: 1    DULoxetine (CYMBALTA) 60 MG capsule, TAKE 2 CAPS BY MOUTH EVERY DAY, Disp: 180 capsule, Rfl: 1    estradiol (ESTRACE) 2 MG tablet, Take 1 tablet by mouth Daily., Disp: 90 tablet, Rfl: 1    ibuprofen (ADVIL,MOTRIN) 600 MG tablet, Take 1 tablet by mouth Every 8 (Eight) Hours As Needed for Mild Pain or Moderate Pain., Disp: 90 tablet, Rfl: 1    levothyroxine (SYNTHROID, LEVOTHROID) 50 MCG tablet, Take 1 tablet by mouth Daily., Disp: 90 tablet, Rfl: 1    losartan (COZAAR) 50 MG tablet, TAKE ONE TABLET BY MOUTH DAILY, Disp: 90 tablet, Rfl: 1    tiZANidine (ZANAFLEX) 4 MG tablet, Take 1 tablet by mouth Every 6 (Six) Hours., Disp: 120 tablet, Rfl: 1    cephalexin (Keflex) 500 MG capsule, Take 1 capsule by mouth 3 (Three) Times a Day., Disp: 30 capsule, Rfl: 0    diazePAM (VALIUM) 2 MG tablet, Take 1 tablet by mouth 2 (Two) Times a Day As Needed for Anxiety. (Patient not taking: Reported on 8/17/2023), Disp: 60 tablet, Rfl: 0    esomeprazole (nexIUM) 40 MG capsule, Take 1 capsule by mouth Every Morning Before Breakfast., Disp: 90 capsule, Rfl: 0    Mirabegron ER (MYRBETRIQ) 25 MG tablet sustained-release 24 hour 24 hr tablet, Take 1 tablet by mouth Daily., Disp: 30 tablet, Rfl: 2    nystatin (MYCOSTATIN) 100,000 unit/mL suspension, , Disp: , Rfl:     pregabalin (LYRICA) 50 MG capsule, Take 1 capsule by mouth 2 (Two) Times a Day. (Patient not taking: Reported on 8/17/2023), Disp: 60 capsule, Rfl: 2    Allergies:   Allergies   Allergen Reactions    Aripiprazole Unknown - Low Severity    Gabapentin Unknown - High Severity    Mirabegron Unknown - Low Severity    Pregabalin Unknown - Low Severity    Tramadol Unknown - Low Severity    Clindamycin/Lincomycin GI Intolerance  "      Objective     Vital Signs  Pulse 80   Temp 98.4 øF (36.9 øC) (Temporal)   Ht 154.9 cm (61\")   Wt 81.6 kg (179 lb 12.8 oz)   SpO2 97%   BMI 33.97 kg/mý   Estimated body mass index is 33.97 kg/mý as calculated from the following:    Height as of this encounter: 154.9 cm (61\").    Weight as of this encounter: 81.6 kg (179 lb 12.8 oz).      Physical Exam  Vitals and nursing note reviewed.   Constitutional:       General: She is not in acute distress.     Appearance: Normal appearance. She is obese. She is not ill-appearing.   HENT:      Head: Normocephalic and atraumatic.      Mouth/Throat:      Mouth: Mucous membranes are moist.      Pharynx: Oropharynx is clear.   Cardiovascular:      Rate and Rhythm: Normal rate and regular rhythm.      Pulses: Normal pulses.      Heart sounds: Normal heart sounds.   Pulmonary:      Effort: Pulmonary effort is normal. No respiratory distress.      Breath sounds: Normal breath sounds.   Musculoskeletal:      Right lower leg: No edema.      Left lower leg: No edema.   Neurological:      Mental Status: She is alert and oriented to person, place, and time. Mental status is at baseline.   Psychiatric:         Attention and Perception: Attention and perception normal.         Speech: Speech normal.         Behavior: Behavior is agitated. Behavior is not slowed or withdrawn. Behavior is cooperative.        Results:  Recent Results (from the past 24 hour(s))   POC Urinalysis Dipstick, Automated    Collection Time: 08/17/23 11:53 AM    Specimen: Urine   Result Value Ref Range    Color Yellow Yellow, Straw, Dark Yellow, Earline    Clarity, UA Clear Clear    Specific Gravity  1.025 1.005 - 1.030    pH, Urine 6.0 5.0 - 8.0    Leukocytes Small (1+) (A) Negative    Nitrite, UA Positive (A) Negative    Protein,  mg/dL (A) Negative mg/dL    Glucose, UA Negative Negative mg/dL    Ketones, UA Negative Negative    Urobilinogen, UA 0.2 E.U./dL Normal, 0.2 E.U./dL    Bilirubin Negative " Negative    Blood, UA Trace (A) Negative    Lot Number [id number]     Expiration Date 04/30/2024         Assessment and Plan     Assessment/Plan:  Diagnoses and all orders for this visit:    1. Acute cystitis without hematuria (Primary)  Assessment & Plan:  I recommend increased fluids and completing full course of antibiotics.  Urine will be sent for a culture to confirm antibiotic efficacy.    Orders:  -     POC Urinalysis Dipstick, Automated  -     Urine Culture - Urine, Urine, Clean Catch; Future  -     Urine Culture - Urine, Urine, Clean Catch  -     cephalexin (Keflex) 500 MG capsule; Take 1 capsule by mouth 3 (Three) Times a Day.  Dispense: 30 capsule; Refill: 0    2. OAB (overactive bladder)  Assessment & Plan:  He has not done as well with the oxybutynin, so we can try to switch it to Myrbetriq for more improved efficacy.  I advised her to monitor her symptoms.    Orders:  -     Mirabegron ER (MYRBETRIQ) 25 MG tablet sustained-release 24 hour 24 hr tablet; Take 1 tablet by mouth Daily.  Dispense: 30 tablet; Refill: 2    3. Gastroesophageal reflux disease, unspecified whether esophagitis present  Assessment & Plan:  We will resume the esomeprazole to see if her symptoms resolve.  If they do not, then I would recommend seeing GI for potential scope.  She and her daughter are in agreement.    Orders:  -     CBC Auto Differential; Future  -     Comprehensive Metabolic Panel; Future  -     esomeprazole (nexIUM) 40 MG capsule; Take 1 capsule by mouth Every Morning Before Breakfast.  Dispense: 90 capsule; Refill: 0  -     CBC Auto Differential  -     Comprehensive Metabolic Panel    4. Chronic pain syndrome  Assessment & Plan:  We will resume the pregabalin since she was tolerating it well, and she has not been able to go any lower on her dose without decrease in daily function.    Orders:  -     ibuprofen (ADVIL,MOTRIN) 600 MG tablet; Take 1 tablet by mouth Every 8 (Eight) Hours As Needed for Mild Pain or Moderate  Pain.  Dispense: 90 tablet; Refill: 1    5. BARBARA (generalized anxiety disorder)  Assessment & Plan:  She was functioning better and had more motivation on the low-dose of medication than she did on either the higher dose or without the medication, so we will refill the diazepam at twice daily as needed.    CANDIS reviewed and compliant.  Request #[id number]      6. Primary hypertension  Assessment & Plan:  I recommend regular blood pressure monitoring and compliance on medication.    Orders:  -     Lipid Panel; Future  -     Lipid Panel    7. Acquired hypothyroidism  Assessment & Plan:  Level for be repeated on today's labs.    Orders:  -     TSH; Future  -     T4, Free; Future  -     TSH  -     T4, Free        Follow Up  Return in about 3 months (around 11/17/2023) for Medicare Wellness.    Ade Cramer PA-C  [facility] Internal Medicine [facility]

## 2023-08-18 ENCOUNTER — TELEPHONE (OUTPATIENT)
Dept: FAMILY MEDICINE CLINIC | Facility: CLINIC | Age: 68
End: 2023-08-18

## 2023-08-18 LAB
ALBUMIN SERPL-MCNC: 3.4 G/DL (ref 3.9–4.9)
ALBUMIN/GLOB SERPL: 0.9 {RATIO} (ref 1.2–2.2)
ALP SERPL-CCNC: 93 IU/L (ref 44–121)
ALT SERPL-CCNC: 11 IU/L (ref 0–32)
AST SERPL-CCNC: 16 IU/L (ref 0–40)
BASOPHILS # BLD AUTO: 0.2 X10E3/UL (ref 0–0.2)
BASOPHILS NFR BLD AUTO: 1 %
BILIRUB SERPL-MCNC: 0.2 MG/DL (ref 0–1.2)
BUN SERPL-MCNC: 28 MG/DL (ref 8–27)
BUN/CREAT SERPL: 12 (ref 12–28)
CALCIUM SERPL-MCNC: 8.8 MG/DL (ref 8.7–10.3)
CHLORIDE SERPL-SCNC: 100 MMOL/L (ref 96–106)
CHOLEST SERPL-MCNC: 191 MG/DL (ref 100–199)
CO2 SERPL-SCNC: 16 MMOL/L (ref 20–29)
CREAT SERPL-MCNC: 2.27 MG/DL (ref 0.57–1)
EGFRCR SERPLBLD CKD-EPI 2021: 23 ML/MIN/1.73
EOSINOPHIL # BLD AUTO: 0.5 X10E3/UL (ref 0–0.4)
EOSINOPHIL NFR BLD AUTO: 4 %
ERYTHROCYTE [DISTWIDTH] IN BLOOD BY AUTOMATED COUNT: 13.4 % (ref 11.7–15.4)
GLOBULIN SER CALC-MCNC: 4 G/DL (ref 1.5–4.5)
GLUCOSE SERPL-MCNC: 144 MG/DL (ref 70–99)
HCT VFR BLD AUTO: 27.3 % (ref 34–46.6)
HDLC SERPL-MCNC: 59 MG/DL
HGB BLD-MCNC: 8.8 G/DL (ref 11.1–15.9)
IMM GRANULOCYTES # BLD AUTO: 0 X10E3/UL (ref 0–0.1)
IMM GRANULOCYTES NFR BLD AUTO: 0 %
LDLC SERPL CALC-MCNC: 102 MG/DL (ref 0–99)
LYMPHOCYTES # BLD AUTO: 2.8 X10E3/UL (ref 0.7–3.1)
LYMPHOCYTES NFR BLD AUTO: 23 %
MCH RBC QN AUTO: 28.5 PG (ref 26.6–33)
MCHC RBC AUTO-ENTMCNC: 32.2 G/DL (ref 31.5–35.7)
MCV RBC AUTO: 88 FL (ref 79–97)
MONOCYTES # BLD AUTO: 0.8 X10E3/UL (ref 0.1–0.9)
MONOCYTES NFR BLD AUTO: 7 %
NEUTROPHILS # BLD AUTO: 7.8 X10E3/UL (ref 1.4–7)
NEUTROPHILS NFR BLD AUTO: 65 %
PLATELET # BLD AUTO: 391 X10E3/UL (ref 150–450)
POTASSIUM SERPL-SCNC: 4.6 MMOL/L (ref 3.5–5.2)
PROT SERPL-MCNC: 7.4 G/DL (ref 6–8.5)
RBC # BLD AUTO: 3.09 X10E6/UL (ref 3.77–5.28)
SODIUM SERPL-SCNC: 136 MMOL/L (ref 134–144)
T4 FREE SERPL-MCNC: 1.28 NG/DL (ref 0.82–1.77)
TRIGL SERPL-MCNC: 175 MG/DL (ref 0–149)
TSH SERPL DL<=0.005 MIU/L-ACNC: 3.43 UIU/ML (ref 0.45–4.5)
VLDLC SERPL CALC-MCNC: 30 MG/DL (ref 5–40)
WBC # BLD AUTO: 12.1 X10E3/UL (ref 3.4–10.8)

## 2023-08-18 NOTE — TELEPHONE ENCOUNTER
Patient called stating that she was seen yesterday and had prescriptions sent in for her. She said that all the prescriptions were sent to [facility] and she needs them sent to  [facility] because they will deliver them to her.

## 2023-08-19 DIAGNOSIS — G89.4 CHRONIC PAIN SYNDROME: ICD-10-CM

## 2023-08-19 DIAGNOSIS — F41.1 GAD (GENERALIZED ANXIETY DISORDER): ICD-10-CM

## 2023-08-19 RX ORDER — DIAZEPAM 2 MG/1
2 TABLET ORAL 2 TIMES DAILY PRN
Qty: 60 TABLET | Refills: 2 | Status: SHIPPED | OUTPATIENT
Start: 2023-08-19

## 2023-08-19 RX ORDER — PREGABALIN 50 MG/1
50 CAPSULE ORAL 2 TIMES DAILY
Qty: 60 CAPSULE | Refills: 2 | Status: SHIPPED | OUTPATIENT
Start: 2023-08-19

## 2023-08-22 DIAGNOSIS — N28.9 DECREASED RENAL FUNCTION: ICD-10-CM

## 2023-08-22 DIAGNOSIS — D64.9 ANEMIA, UNSPECIFIED TYPE: Primary | ICD-10-CM

## 2023-08-22 LAB
BACTERIA UR CULT: ABNORMAL
BACTERIA UR CULT: ABNORMAL
OTHER ANTIBIOTIC SUSC ISLT: ABNORMAL

## 2023-08-23 DIAGNOSIS — G89.4 CHRONIC PAIN SYNDROME: ICD-10-CM

## 2023-08-23 DIAGNOSIS — E03.9 ACQUIRED HYPOTHYROIDISM: ICD-10-CM

## 2023-08-23 RX ORDER — IBUPROFEN 600 MG/1
TABLET ORAL
Qty: 90 TABLET | Refills: 1 | Status: SHIPPED | OUTPATIENT
Start: 2023-08-23

## 2023-08-23 RX ORDER — LEVOTHYROXINE SODIUM 0.05 MG/1
TABLET ORAL
Qty: 90 TABLET | Refills: 1 | Status: SHIPPED | OUTPATIENT
Start: 2023-08-23

## 2023-08-23 RX ORDER — TIZANIDINE 4 MG/1
4 TABLET ORAL EVERY 6 HOURS
Qty: 120 TABLET | Refills: 1 | Status: SHIPPED | OUTPATIENT
Start: 2023-08-23

## 2023-08-24 ENCOUNTER — TELEPHONE (OUTPATIENT)
Dept: FAMILY MEDICINE CLINIC | Facility: CLINIC | Age: 68
End: 2023-08-24

## 2023-08-24 NOTE — TELEPHONE ENCOUNTER
Caller: Alina Tilley    Relationship to patient: Self    Best call back number: [phone]    Patient is needing: PATIENT STATED THAT PHARMACY HAS NOT RECEIVED oxybutynin MEDICATION AND WOULD NEED TO HAVE THIS SENT TO PHARMACY BY 1 PM TODAY PLEASE

## 2023-09-19 DIAGNOSIS — I10 PRIMARY HYPERTENSION: ICD-10-CM

## 2023-09-19 DIAGNOSIS — F41.1 GAD (GENERALIZED ANXIETY DISORDER): ICD-10-CM

## 2023-09-19 DIAGNOSIS — Z79.890 HORMONE REPLACEMENT THERAPY (HRT): ICD-10-CM

## 2023-09-19 RX ORDER — BUSPIRONE HYDROCHLORIDE 30 MG/1
TABLET ORAL
Qty: 180 TABLET | Refills: 1 | OUTPATIENT
Start: 2023-09-19

## 2023-09-19 RX ORDER — HYDROXYZINE 50 MG/1
TABLET, FILM COATED ORAL
Qty: 120 TABLET | Refills: 1 | OUTPATIENT
Start: 2023-09-19

## 2023-09-19 RX ORDER — CARVEDILOL 25 MG/1
TABLET ORAL
Qty: 180 TABLET | Refills: 1 | OUTPATIENT
Start: 2023-09-19

## 2023-09-19 RX ORDER — ESTRADIOL 2 MG/1
TABLET ORAL
Qty: 90 TABLET | Refills: 1 | OUTPATIENT
Start: 2023-09-19

## 2023-09-19 RX ORDER — DULOXETIN HYDROCHLORIDE 60 MG/1
CAPSULE, DELAYED RELEASE ORAL
Qty: 180 CAPSULE | Refills: 1 | OUTPATIENT
Start: 2023-09-19

## 2023-09-20 DIAGNOSIS — G89.4 CHRONIC PAIN SYNDROME: ICD-10-CM

## 2023-09-20 RX ORDER — IBUPROFEN 600 MG/1
600 TABLET ORAL EVERY 8 HOURS PRN
Qty: 90 TABLET | Refills: 1 | OUTPATIENT
Start: 2023-09-20

## 2023-09-20 RX ORDER — TIZANIDINE 4 MG/1
4 TABLET ORAL EVERY 6 HOURS
Qty: 120 TABLET | Refills: 1 | OUTPATIENT
Start: 2023-09-20

## 2023-09-20 NOTE — TELEPHONE ENCOUNTER
Caller: Alina Tilley    Relationship: Self    Best call back number: [phone]     Requested Prescriptions:   Requested Prescriptions     Pending Prescriptions Disp Refills    ibuprofen (ADVIL,MOTRIN) 600 MG tablet 90 tablet 1     Sig: Take 1 tablet by mouth Every 8 (Eight) Hours As Needed for Mild Pain or Moderate Pain.    tiZANidine (ZANAFLEX) 4 MG tablet 120 tablet 1     Sig: Take 1 tablet by mouth Every 6 (Six) Hours.        Pharmacy where request should be sent: [address] [phone] [facility] [phone]      Last office visit with prescribing clinician: 3/23/2023   Last telemedicine visit with prescribing clinician: Visit date not found   Next office visit with prescribing clinician: 11/7/2023         Does the patient have less than a 3 day supply:  [x] Yes  [] No    Would you like a call back once the refill request has been completed: [] Yes [x] No    If the office needs to give you a call back, can they leave a voicemail: [] Yes [x] No    Kianna Meyer Rep   09/20/23 10:04 EDT

## 2023-10-23 ENCOUNTER — TELEPHONE (OUTPATIENT)
Dept: FAMILY MEDICINE CLINIC | Facility: CLINIC | Age: 68
End: 2023-10-23
Payer: MEDICARE

## 2023-10-23 NOTE — TELEPHONE ENCOUNTER
Caller: JarekAlina    Relationship: Self    Best call back number: [phone]     What medications are you currently taking:   Current Outpatient Medications on File Prior to Visit   Medication Sig Dispense Refill    amLODIPine (NORVASC) 5 MG tablet Take 1 tablet by mouth Daily. 90 tablet 1    busPIRone (BUSPAR) 30 MG tablet TAKE ONE TABLET BY MOUTH EVERY TWELVE HOURS 180 tablet 1    cephalexin (Keflex) 500 MG capsule Take 1 capsule by mouth 3 (Three) Times a Day. 30 capsule 0    diazePAM (VALIUM) 2 MG tablet Take 1 tablet by mouth 2 (Two) Times a Day As Needed for Anxiety. 60 tablet 2    DULoxetine (CYMBALTA) 60 MG capsule TAKE 2 CAPS BY MOUTH EVERY  capsule 1    esomeprazole (nexIUM) 40 MG capsule Take 1 capsule by mouth Every Morning Before Breakfast. 90 capsule 0    estradiol (ESTRACE) 2 MG tablet Take 1 tablet by mouth Daily. 90 tablet 1    ibuprofen (ADVIL,MOTRIN) 600 MG tablet TAKE 1 TABLET BY MOUTH EVERY 8 HOURS AS NEEDED FOR MILD PAIN OR MODERATE PAIN. 90 tablet 1    levothyroxine (SYNTHROID, LEVOTHROID) 50 MCG tablet TAKE ONE TABLET BY MOUTH EVERY DAY 90 tablet 1    losartan (COZAAR) 50 MG tablet TAKE ONE TABLET BY MOUTH DAILY 90 tablet 1    Mirabegron ER (MYRBETRIQ) 25 MG tablet sustained-release 24 hour 24 hr tablet Take 1 tablet by mouth Daily. 30 tablet 2    nystatin (MYCOSTATIN) 100,000 unit/mL suspension       pregabalin (LYRICA) 50 MG capsule Take 1 capsule by mouth 2 (Two) Times a Day. 60 capsule 2    tiZANidine (ZANAFLEX) 4 MG tablet TAKE 1 TABLET BY MOUTH EVERY 6 (SIX) HOURS. 120 tablet 1     No current facility-administered medications on file prior to visit.          When did you start taking these medications:     Which medication are you concerned about: Mirabegron ER (MYRBETRIQ) 25 MG tablet, diazePAM (VALIUM) 2 MG tablet, AND tiZANidine (ZANAFLEX) 4 MG tablet    Who prescribed you this medication:     What are your concerns: PATIENT WOULD LIKE TO KNOW IF THE DOSAGE OF THESE  MEDICATIONS CAN BE INCREASED BECAUSE SHE IS STILL NOT ABLE TO SLEEP MORE THAN 2 HOURS.     How long have you had these concerns:

## 2023-10-27 NOTE — TELEPHONE ENCOUNTER
No, our goal was to taper down on the dosages because of changes in mentation and increased risk of falls.     HUB CAN RELAY

## 2023-11-07 ENCOUNTER — OFFICE VISIT (OUTPATIENT)
Dept: FAMILY MEDICINE CLINIC | Facility: CLINIC | Age: 68
End: 2023-11-07
Payer: MEDICARE

## 2023-11-07 VITALS
OXYGEN SATURATION: 98 % | HEART RATE: 68 BPM | BODY MASS INDEX: 35.03 KG/M2 | TEMPERATURE: 98.4 F | WEIGHT: 185.4 LBS | DIASTOLIC BLOOD PRESSURE: 100 MMHG | RESPIRATION RATE: 20 BRPM | SYSTOLIC BLOOD PRESSURE: 168 MMHG

## 2023-11-07 DIAGNOSIS — Z23 FLU VACCINE NEED: ICD-10-CM

## 2023-11-07 DIAGNOSIS — I10 PRIMARY HYPERTENSION: ICD-10-CM

## 2023-11-07 DIAGNOSIS — E03.9 ACQUIRED HYPOTHYROIDISM: ICD-10-CM

## 2023-11-07 DIAGNOSIS — Z51.81 MEDICATION MONITORING ENCOUNTER: ICD-10-CM

## 2023-11-07 DIAGNOSIS — F41.1 GAD (GENERALIZED ANXIETY DISORDER): Primary | ICD-10-CM

## 2023-11-07 DIAGNOSIS — G47.00 INSOMNIA, UNSPECIFIED TYPE: ICD-10-CM

## 2023-11-07 DIAGNOSIS — E66.01 MORBID OBESITY: ICD-10-CM

## 2023-11-07 DIAGNOSIS — G47.8 NON-RESTORATIVE SLEEP: ICD-10-CM

## 2023-11-07 DIAGNOSIS — E78.2 MIXED HYPERLIPIDEMIA: ICD-10-CM

## 2023-11-07 DIAGNOSIS — E55.9 VITAMIN D DEFICIENCY: ICD-10-CM

## 2023-11-07 DIAGNOSIS — Z12.31 ENCOUNTER FOR SCREENING MAMMOGRAM FOR MALIGNANT NEOPLASM OF BREAST: ICD-10-CM

## 2023-11-07 DIAGNOSIS — G89.4 CHRONIC PAIN SYNDROME: ICD-10-CM

## 2023-11-07 LAB
AMPHET+METHAMPHET UR QL: NEGATIVE
AMPHETAMINE INTERNAL CONTROL: ABNORMAL
AMPHETAMINES UR QL: NEGATIVE
BARBITURATE INTERNAL CONTROL: ABNORMAL
BARBITURATES UR QL SCN: NEGATIVE
BENZODIAZ UR QL SCN: POSITIVE
BENZODIAZEPINE INTERNAL CONTROL: ABNORMAL
CANNABINOIDS SERPL QL: NEGATIVE
COCAINE INTERNAL CONTROL: ABNORMAL
COCAINE UR QL: NEGATIVE
MDMA (ECSTASY) INTERNAL CONTROL: ABNORMAL
MDMA UR QL SCN: NEGATIVE
METHADONE INTERNAL CONTROL: ABNORMAL
METHADONE UR QL SCN: NEGATIVE
METHAMPHETAMINE INTERNAL CONTROL: ABNORMAL
OPIATES INTERNAL CONTROL: ABNORMAL
OPIATES UR QL: POSITIVE
OXYCODONE INTERNAL CONTROL: ABNORMAL
OXYCODONE UR QL SCN: NEGATIVE
PCP UR QL SCN: NEGATIVE
PHENCYCLIDINE INTERNAL CONTROL: ABNORMAL
THC INTERNAL CONTROL: ABNORMAL

## 2023-11-07 RX ORDER — OXYBUTYNIN CHLORIDE 15 MG/1
1 TABLET, EXTENDED RELEASE ORAL DAILY
COMMUNITY
Start: 2023-10-17 | End: 2023-11-07

## 2023-11-07 RX ORDER — HYDROXYZINE 50 MG/1
1 TABLET, FILM COATED ORAL EVERY 6 HOURS
COMMUNITY
Start: 2023-07-26

## 2023-11-07 RX ORDER — CARVEDILOL 25 MG/1
25 TABLET ORAL 2 TIMES DAILY WITH MEALS
COMMUNITY
Start: 2023-09-19

## 2023-11-07 RX ORDER — LOSARTAN POTASSIUM 50 MG/1
50 TABLET ORAL DAILY
Qty: 90 TABLET | Refills: 1 | Status: SHIPPED | OUTPATIENT
Start: 2023-11-07 | End: 2023-11-07

## 2023-11-07 RX ORDER — LOSARTAN POTASSIUM 100 MG/1
100 TABLET ORAL DAILY
Qty: 90 TABLET | Refills: 1 | Status: SHIPPED | OUTPATIENT
Start: 2023-11-07

## 2023-11-07 NOTE — ASSESSMENT & PLAN NOTE
Hypertension is worsening.  Dietary sodium restriction.  Regular aerobic exercise.  Ambulatory blood pressure monitoring.  Patient will increase losartan to 100 mg daily.  Blood pressure will be reassessed at the next regular appointment.

## 2023-11-07 NOTE — ASSESSMENT & PLAN NOTE
Patient's (Body mass index is 35.03 kg/m².) indicates that they are morbidly obese (BMI > 40 or > 35 with obesity - related health condition) with health conditions that include hypertension, dyslipidemias and GERD . Weight is unchanged. BMI is is above average; BMI management plan is completed. We discussed portion control and increasing exercise.

## 2023-11-07 NOTE — ASSESSMENT & PLAN NOTE
Psychological condition is unchanged.  I had a discussion with the patient and informed her that I will not increase her controlled medications.  Her urine drug screen was positive for opiates so I will send this for confirmation before refilling medications  Psychological condition  will be reassessed at the next regular appointment.    I spent 45 minutes with the patient and her daughter reviewing current medications, answering questions and concerns, filling out paperwork provided by the patient on the same day as her visit, placing lab orders and referrals, and on documentation.

## 2023-11-07 NOTE — ASSESSMENT & PLAN NOTE
Unchanged, UDS positive for opiates which she is not prescribed.  I will send this for confirmation before refilling controlled medications

## 2023-11-07 NOTE — PROGRESS NOTES
Date: 2023   Patient Name: Alina Tilley  : 1955   MRN: [id number]     Chief Complaint:    Chief Complaint   Patient presents with    hardship form       History of Present Illness: Alina Tilley is a 68 y.o. female who is here today to follow up for anxiety, hypertension, hypothyroidism.  She is requesting refill of diazepam that she takes usually at bedtime to help with anxiety and insomnia.  She requests an increase in dose however we have been trying to decrease her dosing due to falls and fall risk.    She is struggling with insomnia and reports she only sleeps in 1 or 2-hour increments.  I have recommended a sleep study multiple times in previous appointments and patient has refused.  Daughter is with her today and also feels that she needs a sleep study.  Patient agrees to see Dr. Healy.     She is also requesting paperwork be filled out for verification of chronic conditions and a hardship note for the .           Review of Systems:   Review of Systems   Constitutional:  Negative for chills, fatigue and fever.   Respiratory:  Negative for cough and shortness of breath.    Cardiovascular:  Negative for chest pain and palpitations.   Gastrointestinal:  Negative for abdominal pain, constipation, diarrhea, nausea and vomiting.   Musculoskeletal:  Positive for arthralgias, back pain, myalgias, neck pain and neck stiffness.   Skin:  Positive for bruise.   Neurological:  Positive for weakness. Negative for dizziness and headache.   Psychiatric/Behavioral:  Positive for stress. Negative for depressed mood. The patient is not nervous/anxious.        Past Medical History:   Past Medical History:   Diagnosis Date    Anxiety     Arthritis     Common migraine     Congestive heart failure (CHF)     Esophageal reflux     Hearing problem     IBS (irritable bowel syndrome)     Insomnia     Neurotic depression     Obesity     Overactive bladder     Thyroiditis     Vision problems        Past  Surgical History:   Past Surgical History:   Procedure Laterality Date    APPENDECTOMY      HERNIA REPAIR      HYSTERECTOMY      KIDNEY SURGERY      LAPAROSCOPIC TUBAL LIGATION      TONSILLECTOMY         Family History:   Family History   Problem Relation Age of Onset    Osteoporosis Mother     Osteoarthritis Mother     Hypertension Father     Mental illness Father     Alcohol abuse Father     Depression Father     Hypertension Brother     Mental illness Brother     Alcohol abuse Brother     Obesity Other     Irritable bowel syndrome Other     Mental illness Other     Stroke Other     Diabetes Other     Cancer Other        Social History:   Social History     Socioeconomic History    Marital status: Single   Tobacco Use    Smoking status: Never    Smokeless tobacco: Never   Vaping Use    Vaping Use: Never used   Substance and Sexual Activity    Alcohol use: Never    Drug use: Never    Sexual activity: Defer       Medications:     Current Outpatient Medications:     amLODIPine (NORVASC) 5 MG tablet, Take 1 tablet by mouth Daily., Disp: 90 tablet, Rfl: 1    busPIRone (BUSPAR) 30 MG tablet, TAKE ONE TABLET BY MOUTH EVERY TWELVE HOURS, Disp: 180 tablet, Rfl: 1    carvedilol (COREG) 25 MG tablet, Take 1 tablet by mouth 2 (Two) Times a Day With Meals., Disp: , Rfl:     cephalexin (Keflex) 500 MG capsule, Take 1 capsule by mouth 3 (Three) Times a Day., Disp: 30 capsule, Rfl: 0    diazePAM (VALIUM) 2 MG tablet, Take 1 tablet by mouth 2 (Two) Times a Day As Needed for Anxiety., Disp: 60 tablet, Rfl: 2    DULoxetine (CYMBALTA) 60 MG capsule, TAKE 2 CAPS BY MOUTH EVERY DAY, Disp: 180 capsule, Rfl: 1    esomeprazole (nexIUM) 40 MG capsule, Take 1 capsule by mouth Every Morning Before Breakfast., Disp: 90 capsule, Rfl: 0    estradiol (ESTRACE) 2 MG tablet, Take 1 tablet by mouth Daily., Disp: 90 tablet, Rfl: 1    hydrOXYzine (ATARAX) 50 MG tablet, Take 1 tablet by mouth Every 6 (Six) Hours., Disp: , Rfl:     ibuprofen  (ADVIL,MOTRIN) 600 MG tablet, TAKE 1 TABLET BY MOUTH EVERY 8 HOURS AS NEEDED FOR MILD PAIN OR MODERATE PAIN., Disp: 90 tablet, Rfl: 1    levothyroxine (SYNTHROID, LEVOTHROID) 50 MCG tablet, TAKE ONE TABLET BY MOUTH EVERY DAY, Disp: 90 tablet, Rfl: 1    Mirabegron ER (MYRBETRIQ) 25 MG tablet sustained-release 24 hour 24 hr tablet, Take 1 tablet by mouth Daily., Disp: 30 tablet, Rfl: 2    nystatin (MYCOSTATIN) 100,000 unit/mL suspension, , Disp: , Rfl:     pregabalin (LYRICA) 50 MG capsule, Take 1 capsule by mouth 2 (Two) Times a Day., Disp: 60 capsule, Rfl: 2    tiZANidine (ZANAFLEX) 4 MG tablet, TAKE 1 TABLET BY MOUTH EVERY 6 (SIX) HOURS., Disp: 120 tablet, Rfl: 1    losartan (COZAAR) 100 MG tablet, Take 1 tablet by mouth Daily., Disp: 90 tablet, Rfl: 1    Allergies:   Allergies   Allergen Reactions    Aripiprazole Unknown - Low Severity    Gabapentin Unknown - High Severity    Mirabegron Unknown - Low Severity    Pregabalin Unknown - Low Severity    Tramadol Unknown - Low Severity    Clindamycin/Lincomycin GI Intolerance       PHQ-2 Total Score:     PHQ-9 Total Score:       Physical Exam:  Vital Signs:   Vitals:    11/07/23 1342   BP: 168/100   BP Location: Right arm   Patient Position: Sitting   Pulse: 68   Resp: 20   Temp: 98.4 °F (36.9 °C)   TempSrc: Temporal   SpO2: 98%   Weight: 84.1 kg (185 lb 6.4 oz)   PainSc:   7   PainLoc: Neck     Body mass index is 35.03 kg/m².     Physical Exam  Vitals and nursing note reviewed.   Constitutional:       Appearance: She is obese.      Comments: Chronically ill-appearing   Cardiovascular:      Rate and Rhythm: Normal rate and regular rhythm.      Heart sounds: Normal heart sounds. No murmur heard.  Pulmonary:      Effort: Pulmonary effort is normal.      Breath sounds: Normal breath sounds. No wheezing.   Neurological:      Mental Status: She is alert and oriented to person, place, and time. Mental status is at baseline.   Psychiatric:         Mood and Affect: Mood normal.          Behavior: Behavior normal.           Assessment/Plan:   Diagnoses and all orders for this visit:    1. BARBARA (generalized anxiety disorder) (Primary)  Assessment & Plan:  Psychological condition is unchanged.  I had a discussion with the patient and informed her that I will not increase her controlled medications.  Her urine drug screen was positive for opiates so I will send this for confirmation before refilling medications  Psychological condition  will be reassessed at the next regular appointment.    I spent 45 minutes with the patient and her daughter reviewing current medications, answering questions and concerns, filling out paperwork provided by the patient on the same day as her visit, placing lab orders and referrals, and on documentation.      2. Chronic pain syndrome  Assessment & Plan:  Unchanged, UDS positive for opiates which she is not prescribed.  I will send this for confirmation before refilling controlled medications      3. Insomnia, unspecified type  Assessment & Plan:  Referred to ENT for sleep study    Orders:  -     Ambulatory Referral to ENT (Otolaryngology)    4. Non-restorative sleep  Assessment & Plan:  Sleep study as above    Orders:  -     Ambulatory Referral to ENT (Otolaryngology)    5. Primary hypertension  Assessment & Plan:  Hypertension is worsening.  Dietary sodium restriction.  Regular aerobic exercise.  Ambulatory blood pressure monitoring.  Patient will increase losartan to 100 mg daily.  Blood pressure will be reassessed at the next regular appointment.    Orders:  -     Discontinue: losartan (COZAAR) 50 MG tablet; Take 1 tablet by mouth Daily.  Dispense: 90 tablet; Refill: 1  -     losartan (COZAAR) 100 MG tablet; Take 1 tablet by mouth Daily.  Dispense: 90 tablet; Refill: 1    6. Flu vaccine need  -     Fluzone High-Dose 65+yrs    7. Encounter for screening mammogram for malignant neoplasm of breast  -     Mammo Screening Bilateral With CAD; Future    8. Vitamin D  deficiency  -     Vitamin D,25-Hydroxy; Future  -     Vitamin D,25-Hydroxy    9. Acquired hypothyroidism  Assessment & Plan:  We will recheck labs today    Orders:  -     TSH; Future  -     T4, Free; Future  -     TSH  -     T4, Free    10. Mixed hyperlipidemia  -     Lipid Panel; Future  -     Comprehensive Metabolic Panel; Future  -     CBC Auto Differential; Future  -     Lipid Panel  -     Comprehensive Metabolic Panel  -     CBC Auto Differential    11. Medication monitoring encounter  -     POC Urine Drug Screen Premier Bio-Cup  -     Opiates Confirmation, Urine - Urine, Clean Catch; Future  -     Opiates Confirmation, Urine - Urine, Clean Catch    12. Morbid obesity  Assessment & Plan:  Patient's (Body mass index is 35.03 kg/m².) indicates that they are morbidly obese (BMI > 40 or > 35 with obesity - related health condition) with health conditions that include hypertension, dyslipidemias and GERD . Weight is unchanged. BMI is is above average; BMI management plan is completed. We discussed portion control and increasing exercise.              Follow Up:   Return in about 3 months (around 2/7/2024) for Med Recheck.    Avis Mejias DO  [facility]

## 2023-11-07 NOTE — ASSESSMENT & PLAN NOTE
Stable, UDS positive for opiates.  Will send for confirmation before refilling controlled medications

## 2023-11-08 ENCOUNTER — TELEPHONE (OUTPATIENT)
Dept: FAMILY MEDICINE CLINIC | Facility: CLINIC | Age: 68
End: 2023-11-08

## 2023-11-08 LAB
25(OH)D3+25(OH)D2 SERPL-MCNC: 33.2 NG/ML (ref 30–100)
ALBUMIN SERPL-MCNC: 3.8 G/DL (ref 3.9–4.9)
ALBUMIN/GLOB SERPL: 1.2 {RATIO} (ref 1.2–2.2)
ALP SERPL-CCNC: 75 IU/L (ref 44–121)
ALT SERPL-CCNC: 8 IU/L (ref 0–32)
AST SERPL-CCNC: 15 IU/L (ref 0–40)
BASOPHILS # BLD AUTO: 0.1 X10E3/UL (ref 0–0.2)
BASOPHILS NFR BLD AUTO: 1 %
BILIRUB SERPL-MCNC: <0.2 MG/DL (ref 0–1.2)
BUN SERPL-MCNC: 31 MG/DL (ref 8–27)
BUN/CREAT SERPL: 14 (ref 12–28)
CALCIUM SERPL-MCNC: 9.2 MG/DL (ref 8.7–10.3)
CHLORIDE SERPL-SCNC: 110 MMOL/L (ref 96–106)
CHOLEST SERPL-MCNC: 186 MG/DL (ref 100–199)
CO2 SERPL-SCNC: 19 MMOL/L (ref 20–29)
CREAT SERPL-MCNC: 2.26 MG/DL (ref 0.57–1)
EGFRCR SERPLBLD CKD-EPI 2021: 23 ML/MIN/1.73
EOSINOPHIL # BLD AUTO: 0.4 X10E3/UL (ref 0–0.4)
EOSINOPHIL NFR BLD AUTO: 4 %
ERYTHROCYTE [DISTWIDTH] IN BLOOD BY AUTOMATED COUNT: 15 % (ref 11.7–15.4)
GLOBULIN SER CALC-MCNC: 3.3 G/DL (ref 1.5–4.5)
GLUCOSE SERPL-MCNC: 126 MG/DL (ref 70–99)
HCT VFR BLD AUTO: 29.2 % (ref 34–46.6)
HDLC SERPL-MCNC: 62 MG/DL
HGB BLD-MCNC: 9.1 G/DL (ref 11.1–15.9)
IMM GRANULOCYTES # BLD AUTO: 0 X10E3/UL (ref 0–0.1)
IMM GRANULOCYTES NFR BLD AUTO: 0 %
LDLC SERPL CALC-MCNC: 96 MG/DL (ref 0–99)
LYMPHOCYTES # BLD AUTO: 2.3 X10E3/UL (ref 0.7–3.1)
LYMPHOCYTES NFR BLD AUTO: 21 %
MCH RBC QN AUTO: 27.1 PG (ref 26.6–33)
MCHC RBC AUTO-ENTMCNC: 31.2 G/DL (ref 31.5–35.7)
MCV RBC AUTO: 87 FL (ref 79–97)
MONOCYTES # BLD AUTO: 0.7 X10E3/UL (ref 0.1–0.9)
MONOCYTES NFR BLD AUTO: 7 %
NEUTROPHILS # BLD AUTO: 7.7 X10E3/UL (ref 1.4–7)
NEUTROPHILS NFR BLD AUTO: 67 %
PLATELET # BLD AUTO: 302 X10E3/UL (ref 150–450)
POTASSIUM SERPL-SCNC: 5.1 MMOL/L (ref 3.5–5.2)
PROT SERPL-MCNC: 7.1 G/DL (ref 6–8.5)
RBC # BLD AUTO: 3.36 X10E6/UL (ref 3.77–5.28)
SODIUM SERPL-SCNC: 142 MMOL/L (ref 134–144)
T4 FREE SERPL-MCNC: 1.06 NG/DL (ref 0.82–1.77)
TRIGL SERPL-MCNC: 166 MG/DL (ref 0–149)
TSH SERPL DL<=0.005 MIU/L-ACNC: 1.74 UIU/ML (ref 0.45–4.5)
VLDLC SERPL CALC-MCNC: 28 MG/DL (ref 5–40)
WBC # BLD AUTO: 11.2 X10E3/UL (ref 3.4–10.8)

## 2023-11-08 NOTE — TELEPHONE ENCOUNTER
Caller: Alina Tilley    Relationship: Self    Best call back number: [phone]    What medication are you requesting: ibuprofen (ADVIL,MOTRIN) 600 MG tablet     What are your current symptoms: FROM YESTERDAYS VISIT SHE THOUGHT YOU WERE SENDING IN A DIFFERENT NUMBER OF TABLETS BUT PHARMACY DOESN'T HAVE A  PRESCRIPTION.    IF YOU CAN GET IN TODAY THEY WILL DELIVER TOMORROW..     PLEASE CALL TO DISCUSS THIS AND OTHER THINGS.       If a prescription is needed, what is your preferred pharmacy and phone number: [address] - [phone] [facility] [phone] FX     Additional notes:    WANTS TO ALSO KNOW ABOUT LABS AND URINE TEST.

## 2023-11-10 NOTE — TELEPHONE ENCOUNTER
PT CALLED TO CHECK ON STATUS FOR RX      ibuprofen (ADVIL,MOTRIN) 600     [facility] - [place], [place] - [address] - [phone]  - [phone] FX

## 2023-11-11 LAB
LABORATORY COMMENT REPORT: NORMAL
OPIATES UR QL SCN: NEGATIVE NG/ML

## 2023-11-13 ENCOUNTER — TELEPHONE (OUTPATIENT)
Dept: FAMILY MEDICINE CLINIC | Facility: CLINIC | Age: 68
End: 2023-11-13
Payer: MEDICARE

## 2023-11-13 NOTE — TELEPHONE ENCOUNTER
PATIENT WAS TOLD AT HER VISIT ON 11-7 THAT THE NUMBER OF TABLETS FOR HER IBUPROFEN WOULD BE INCREASED. [facility] SAID THEY DO NOT HAVE A RX AT ALL FOR THIS.

## 2023-11-14 NOTE — TELEPHONE ENCOUNTER
I absolutely did not say that. We actually discussed the opposite-that they pose a high cardiovascular risk but because of recent severe renal impairment she can no longer take any NSAIDs. She also needs to stop Nexium, and we need to taper her off Cymbalta and Buspar. She has severe renal impairment and needs to see nephrology.     HUB CAN RELAY

## 2023-11-17 ENCOUNTER — TELEPHONE (OUTPATIENT)
Dept: FAMILY MEDICINE CLINIC | Facility: CLINIC | Age: 68
End: 2023-11-17
Payer: MEDICARE

## 2023-11-17 NOTE — TELEPHONE ENCOUNTER
----- Message from Avis Mejias DO sent at 11/16/2023 10:02 PM EST -----  Please let the patient know she has had severe worsening of kidney function likely secondary to uncontrolled high blood pressure.  We need to get her in to see a kidney doctor soon as possible.  Does she have a preference on who she would like to see?  She is also significantly anemic likely secondary to reduced kidney function.    HUB CAN RELAY

## 2023-11-20 DIAGNOSIS — F41.1 GAD (GENERALIZED ANXIETY DISORDER): ICD-10-CM

## 2023-11-20 DIAGNOSIS — G89.4 CHRONIC PAIN SYNDROME: ICD-10-CM

## 2023-11-20 DIAGNOSIS — N32.81 OAB (OVERACTIVE BLADDER): ICD-10-CM

## 2023-11-20 RX ORDER — MIRABEGRON 25 MG/1
25 TABLET, FILM COATED, EXTENDED RELEASE ORAL DAILY
Qty: 30 TABLET | Refills: 2 | Status: SHIPPED | OUTPATIENT
Start: 2023-11-20 | End: 2023-11-21

## 2023-11-21 RX ORDER — DULOXETIN HYDROCHLORIDE 60 MG/1
CAPSULE, DELAYED RELEASE ORAL
Qty: 7 CAPSULE | Refills: 0 | Status: SHIPPED | OUTPATIENT
Start: 2023-11-21

## 2023-11-21 RX ORDER — DIAZEPAM 2 MG/1
2 TABLET ORAL 2 TIMES DAILY PRN
Qty: 60 TABLET | Refills: 2 | OUTPATIENT
Start: 2023-11-21

## 2023-11-21 RX ORDER — DIAZEPAM 2 MG/1
1 TABLET ORAL 2 TIMES DAILY PRN
Qty: 30 TABLET | Refills: 1 | Status: SHIPPED | OUTPATIENT
Start: 2023-11-21

## 2023-11-21 RX ORDER — PREGABALIN 25 MG/1
25 CAPSULE ORAL 2 TIMES DAILY
Qty: 60 CAPSULE | Refills: 1 | Status: SHIPPED | OUTPATIENT
Start: 2023-11-21

## 2023-11-21 RX ORDER — TIZANIDINE 4 MG/1
4 TABLET ORAL EVERY 6 HOURS
Qty: 120 TABLET | Refills: 1 | OUTPATIENT
Start: 2023-11-21

## 2023-11-21 RX ORDER — BUSPIRONE HYDROCHLORIDE 15 MG/1
15 TABLET ORAL 2 TIMES DAILY
Qty: 60 TABLET | Refills: 1 | Status: SHIPPED | OUTPATIENT
Start: 2023-11-21

## 2023-11-21 RX ORDER — DULOXETIN HYDROCHLORIDE 30 MG/1
CAPSULE, DELAYED RELEASE ORAL
Qty: 7 CAPSULE | Refills: 0 | Status: SHIPPED | OUTPATIENT
Start: 2023-11-21

## 2023-11-21 RX ORDER — PREGABALIN 50 MG/1
50 CAPSULE ORAL 3 TIMES DAILY
OUTPATIENT
Start: 2023-11-21

## 2023-11-30 ENCOUNTER — TELEPHONE (OUTPATIENT)
Dept: FAMILY MEDICINE CLINIC | Facility: CLINIC | Age: 68
End: 2023-11-30
Payer: MEDICARE

## 2023-12-21 RX ORDER — DULOXETIN HYDROCHLORIDE 30 MG/1
CAPSULE, DELAYED RELEASE ORAL
Qty: 7 CAPSULE | Refills: 0 | Status: SHIPPED | OUTPATIENT
Start: 2023-12-21

## 2024-06-24 RX ORDER — IBUPROFEN 600 MG/1
600 TABLET ORAL EVERY 8 HOURS PRN
Qty: 90 TABLET | Refills: 1 | OUTPATIENT
Start: 2024-06-24

## 2024-07-04 DIAGNOSIS — E03.9 ACQUIRED HYPOTHYROIDISM: ICD-10-CM

## 2024-07-05 RX ORDER — LEVOTHYROXINE SODIUM 0.05 MG/1
TABLET ORAL
Qty: 90 TABLET | Refills: 1 | Status: SHIPPED | OUTPATIENT
Start: 2024-07-05

## 2024-10-05 RX ORDER — IBUPROFEN 600 MG/1
600 TABLET, FILM COATED ORAL EVERY 8 HOURS PRN
Qty: 90 TABLET | Refills: 1 | Status: SHIPPED | OUTPATIENT
Start: 2024-10-05

## 2024-11-27 RX ORDER — IBUPROFEN 600 MG/1
600 TABLET, FILM COATED ORAL EVERY 8 HOURS PRN
Qty: 90 TABLET | Refills: 1 | OUTPATIENT
Start: 2024-11-27

## 2024-12-04 RX ORDER — DULOXETIN HYDROCHLORIDE 60 MG/1
CAPSULE, DELAYED RELEASE ORAL
Qty: 7 CAPSULE | Refills: 0 | OUTPATIENT
Start: 2024-12-04

## 2024-12-04 RX ORDER — IBUPROFEN 600 MG/1
600 TABLET, FILM COATED ORAL EVERY 8 HOURS PRN
Qty: 90 TABLET | Refills: 0 | OUTPATIENT
Start: 2024-12-04

## 2025-03-06 RX ORDER — DULOXETIN HYDROCHLORIDE 60 MG/1
CAPSULE, DELAYED RELEASE ORAL
Qty: 7 CAPSULE | Refills: 0 | OUTPATIENT
Start: 2025-03-06

## 2025-03-06 NOTE — TELEPHONE ENCOUNTER
Requested Prescriptions:   Requested Prescriptions     Pending Prescriptions Disp Refills    DULoxetine (CYMBALTA) 60 MG capsule 7 capsule 0     Si capsule PO daily for 1 week then decrease to 30mg dose.        Pharmacy where request should be sent: [facility] - [place], [place] - [address] [phone] [facility] [phone] FX     Last office visit with prescribing clinician: Visit date not found   Last telemedicine visit with prescribing clinician: Visit date not found   Next office visit with prescribing clinician: Visit date not found       Kianna Campoverde Rep   25 11:32 EST

## 2025-05-06 RX ORDER — DULOXETIN HYDROCHLORIDE 60 MG/1
CAPSULE, DELAYED RELEASE ORAL
Qty: 7 CAPSULE | Refills: 0 | OUTPATIENT
Start: 2025-05-06

## 2025-05-06 NOTE — TELEPHONE ENCOUNTER
HUB TO RELAY  Attempted to reach pt LVM for pt notifying her of the medication refill was denied and she will need to see a new PCP in order to get that medication refilled. Notified pt that our office isnt accepting new pts at this time recommended the [facility] office or [place] office

## 2025-05-06 NOTE — TELEPHONE ENCOUNTER
Requested Prescriptions:   Requested Prescriptions     Pending Prescriptions Disp Refills    DULoxetine (CYMBALTA) 60 MG capsule 7 capsule 0     Si capsule PO daily for 1 week then decrease to 30mg dose.        Pharmacy where request should be sent: [facility] - [place], [place] - [address] [phone] [facility] [phone] FX     Last office visit with prescribing clinician: Visit date not found   Last telemedicine visit with prescribing clinician: Visit date not found   Next office visit with prescribing clinician: Visit date not found         Kianna Campoverde Rep   25 14:05 EDT